# Patient Record
Sex: MALE | Race: WHITE | NOT HISPANIC OR LATINO | Employment: OTHER | URBAN - METROPOLITAN AREA
[De-identification: names, ages, dates, MRNs, and addresses within clinical notes are randomized per-mention and may not be internally consistent; named-entity substitution may affect disease eponyms.]

---

## 2023-03-27 PROBLEM — E66.9 CLASS 2 OBESITY WITH BODY MASS INDEX (BMI) OF 38.0 TO 38.9 IN ADULT: Status: ACTIVE | Noted: 2023-03-27

## 2023-03-27 PROBLEM — R29.898 UPPER EXTREMITY WEAKNESS: Status: ACTIVE | Noted: 2023-03-27

## 2023-03-27 PROBLEM — D50.9 IRON DEFICIENCY ANEMIA: Status: ACTIVE | Noted: 2023-03-27

## 2023-03-27 PROBLEM — K21.9 GASTROESOPHAGEAL REFLUX DISEASE: Status: ACTIVE | Noted: 2023-03-27

## 2023-03-27 PROBLEM — K29.70 GASTRITIS: Status: ACTIVE | Noted: 2023-03-27

## 2023-03-27 PROBLEM — E78.00 HYPERCHOLESTEROLEMIA: Status: ACTIVE | Noted: 2023-03-27

## 2023-03-27 PROBLEM — J10.1 INFLUENZA B: Status: ACTIVE | Noted: 2023-03-27

## 2023-03-27 PROBLEM — R29.3 POSTURE ABNORMALITY: Status: ACTIVE | Noted: 2023-03-27

## 2023-03-27 PROBLEM — G47.31 SLEEP APNEA, PRIMARY CENTRAL: Status: ACTIVE | Noted: 2023-03-27

## 2023-03-27 PROBLEM — I10 BENIGN ESSENTIAL HYPERTENSION: Status: ACTIVE | Noted: 2023-03-27

## 2023-03-27 PROBLEM — R79.0 ABNORMAL RESULT OF IRON PROFILE TESTING: Status: ACTIVE | Noted: 2023-03-27

## 2023-03-27 PROBLEM — M54.12 CERVICAL RADICULOPATHY: Status: ACTIVE | Noted: 2023-03-27

## 2023-03-27 PROBLEM — K91.2 MALNUTRITION FOLLOWING GASTROINTESTINAL SURGERY (HHS-HCC): Status: ACTIVE | Noted: 2023-03-27

## 2023-03-27 PROBLEM — E79.0 ELEVATED URIC ACID IN BLOOD: Status: ACTIVE | Noted: 2023-03-27

## 2023-03-27 PROBLEM — S16.1XXA NECK STRAIN: Status: ACTIVE | Noted: 2023-03-27

## 2023-03-27 PROBLEM — R50.9 FEVER IN ADULT: Status: ACTIVE | Noted: 2023-03-27

## 2023-03-27 PROBLEM — K91.2 POSTOPERATIVE MALABSORPTION (HHS-HCC): Status: ACTIVE | Noted: 2023-03-27

## 2023-03-27 PROBLEM — J31.0 RHINITIS: Status: ACTIVE | Noted: 2023-03-27

## 2023-03-27 PROBLEM — N52.9 ERECTILE DYSFUNCTION: Status: ACTIVE | Noted: 2023-03-27

## 2023-03-27 PROBLEM — G47.33 OBSTRUCTIVE SLEEP APNEA: Status: ACTIVE | Noted: 2023-03-27

## 2023-03-27 PROBLEM — R11.2 NAUSEA & VOMITING: Status: ACTIVE | Noted: 2023-03-27

## 2023-03-27 PROBLEM — L72.9 CYST OF SKIN: Status: ACTIVE | Noted: 2023-03-27

## 2023-03-27 PROBLEM — R73.09 ABNORMAL GLUCOSE: Status: ACTIVE | Noted: 2023-03-27

## 2023-03-27 PROBLEM — J45.909 ACTIVE ASTHMA (HHS-HCC): Status: ACTIVE | Noted: 2023-03-27
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
## 2023-03-27 PROBLEM — E11.9 CONTROLLED TYPE 2 DIABETES MELLITUS WITHOUT COMPLICATION, WITHOUT LONG-TERM CURRENT USE OF INSULIN (MULTI): Status: ACTIVE | Noted: 2023-03-27

## 2023-03-27 PROBLEM — E66.812 CLASS 2 OBESITY WITH BODY MASS INDEX (BMI) OF 38.0 TO 38.9 IN ADULT: Status: ACTIVE | Noted: 2023-03-27

## 2023-03-27 PROBLEM — I25.10 CORONARY ATHEROSCLEROSIS: Status: ACTIVE | Noted: 2023-03-27

## 2023-03-27 PROBLEM — F51.01 PRIMARY INSOMNIA: Status: ACTIVE | Noted: 2023-03-27

## 2023-03-27 PROBLEM — E78.1 HYPERTRIGLYCERIDEMIA: Status: ACTIVE | Noted: 2023-03-27

## 2023-03-27 PROBLEM — S86.112A STRAIN OF GASTROCNEMIUS MUSCLE OF LEFT LOWER EXTREMITY: Status: ACTIVE | Noted: 2023-03-27

## 2023-03-27 PROBLEM — K57.90 DIVERTICULOSIS: Status: ACTIVE | Noted: 2023-03-27

## 2023-03-27 PROBLEM — E55.9 VITAMIN D DEFICIENCY: Status: ACTIVE | Noted: 2023-03-27

## 2023-03-27 PROBLEM — Z98.84 BARIATRIC SURGERY STATUS: Status: ACTIVE | Noted: 2023-03-27

## 2023-03-27 PROBLEM — R73.9 HEMOGLOBIN A1C BETWEEN 7.0% AND 9.0%: Status: ACTIVE | Noted: 2023-03-27

## 2023-03-27 PROBLEM — K57.31 HEMORRHAGE OF LARGE INTESTINE DUE TO DIVERTICULAR DISEASE: Status: ACTIVE | Noted: 2023-03-27

## 2023-03-27 PROBLEM — G47.19 EXCESSIVE DAYTIME SLEEPINESS: Status: ACTIVE | Noted: 2023-03-27

## 2023-03-27 RX ORDER — HYDROCHLOROTHIAZIDE 25 MG/1
1 TABLET ORAL DAILY
COMMUNITY
Start: 2019-04-03 | End: 2023-10-31

## 2023-03-27 RX ORDER — ALBUTEROL SULFATE 90 UG/1
1-2 AEROSOL, METERED RESPIRATORY (INHALATION) AS NEEDED
COMMUNITY
Start: 2018-07-03

## 2023-03-27 RX ORDER — SILDENAFIL 50 MG/1
1-2 TABLET, FILM COATED ORAL DAILY PRN
COMMUNITY
Start: 2022-04-22 | End: 2023-10-27 | Stop reason: ALTCHOICE

## 2023-03-27 RX ORDER — FERROUS SULFATE 325(65) MG
1 TABLET ORAL DAILY
COMMUNITY
Start: 2021-07-29

## 2023-03-27 RX ORDER — PANTOPRAZOLE SODIUM 40 MG/1
1 TABLET, DELAYED RELEASE ORAL DAILY
COMMUNITY
Start: 2015-11-08 | End: 2023-07-26

## 2023-03-27 RX ORDER — METFORMIN HYDROCHLORIDE 500 MG/1
1 TABLET ORAL 2 TIMES DAILY
COMMUNITY
Start: 2021-07-29 | End: 2023-05-17

## 2023-03-27 RX ORDER — ATENOLOL 25 MG/1
25 TABLET ORAL 2 TIMES DAILY
COMMUNITY
Start: 2020-05-27 | End: 2023-10-27 | Stop reason: ALTCHOICE

## 2023-03-27 RX ORDER — AMLODIPINE BESYLATE 5 MG/1
1 TABLET ORAL 2 TIMES DAILY
COMMUNITY
Start: 2017-01-05 | End: 2023-10-31

## 2023-03-27 RX ORDER — MULTIVIT-MIN/IRON/FOLIC ACID/K 18-600-40
1 CAPSULE ORAL DAILY
COMMUNITY
Start: 2019-03-05

## 2023-03-27 RX ORDER — ASPIRIN 81 MG/1
TABLET ORAL
COMMUNITY

## 2023-03-27 RX ORDER — BENAZEPRIL HYDROCHLORIDE 20 MG/1
1 TABLET ORAL 2 TIMES DAILY
COMMUNITY
Start: 2019-08-26 | End: 2023-06-28

## 2023-03-27 RX ORDER — UBIDECARENONE 75 MG
CAPSULE ORAL
COMMUNITY
Start: 2020-11-25

## 2023-03-27 RX ORDER — FENOFIBRATE 145 MG/1
145 TABLET, FILM COATED ORAL DAILY
COMMUNITY
Start: 2022-04-22 | End: 2023-04-21

## 2023-03-27 RX ORDER — MULTIVITAMIN
1 TABLET ORAL 2 TIMES DAILY
COMMUNITY

## 2023-03-27 RX ORDER — LOVASTATIN 10 MG/1
1 TABLET ORAL NIGHTLY
COMMUNITY
Start: 2017-01-05 | End: 2023-06-28

## 2023-03-29 ENCOUNTER — OFFICE VISIT (OUTPATIENT)
Dept: PRIMARY CARE | Facility: CLINIC | Age: 58
End: 2023-03-29
Payer: COMMERCIAL

## 2023-03-29 VITALS
WEIGHT: 233 LBS | HEART RATE: 58 BPM | OXYGEN SATURATION: 98 % | DIASTOLIC BLOOD PRESSURE: 80 MMHG | SYSTOLIC BLOOD PRESSURE: 122 MMHG | BODY MASS INDEX: 35.43 KG/M2

## 2023-03-29 DIAGNOSIS — R30.0 DYSURIA: ICD-10-CM

## 2023-03-29 DIAGNOSIS — R21 RASH: Primary | ICD-10-CM

## 2023-03-29 LAB
POC APPEARANCE, URINE: CLEAR
POC BILIRUBIN, URINE: NEGATIVE
POC BLOOD, URINE: NEGATIVE
POC COLOR, URINE: YELLOW
POC GLUCOSE, URINE: NEGATIVE MG/DL
POC KETONES, URINE: NEGATIVE MG/DL
POC LEUKOCYTES, URINE: NEGATIVE
POC NITRITE,URINE: NEGATIVE
POC PH, URINE: 7 PH
POC PROTEIN, URINE: NEGATIVE MG/DL
POC SPECIFIC GRAVITY, URINE: 1.02
POC UROBILINOGEN, URINE: 0.2 EU/DL

## 2023-03-29 PROCEDURE — 1036F TOBACCO NON-USER: CPT | Performed by: INTERNAL MEDICINE

## 2023-03-29 PROCEDURE — 4010F ACE/ARB THERAPY RXD/TAKEN: CPT | Performed by: INTERNAL MEDICINE

## 2023-03-29 PROCEDURE — 99213 OFFICE O/P EST LOW 20 MIN: CPT | Performed by: INTERNAL MEDICINE

## 2023-03-29 PROCEDURE — 3074F SYST BP LT 130 MM HG: CPT | Performed by: INTERNAL MEDICINE

## 2023-03-29 PROCEDURE — 3079F DIAST BP 80-89 MM HG: CPT | Performed by: INTERNAL MEDICINE

## 2023-03-29 PROCEDURE — 81003 URINALYSIS AUTO W/O SCOPE: CPT | Performed by: INTERNAL MEDICINE

## 2023-03-29 PROCEDURE — 3008F BODY MASS INDEX DOCD: CPT | Performed by: INTERNAL MEDICINE

## 2023-03-29 RX ORDER — KETOCONAZOLE 20 MG/G
CREAM TOPICAL 2 TIMES DAILY
Qty: 30 G | Refills: 0 | Status: ON HOLD | OUTPATIENT
Start: 2023-03-29 | End: 2023-10-04 | Stop reason: ALTCHOICE

## 2023-03-29 ASSESSMENT — PATIENT HEALTH QUESTIONNAIRE - PHQ9
1. LITTLE INTEREST OR PLEASURE IN DOING THINGS: NOT AT ALL
SUM OF ALL RESPONSES TO PHQ9 QUESTIONS 1 AND 2: 0
2. FEELING DOWN, DEPRESSED OR HOPELESS: NOT AT ALL

## 2023-03-29 NOTE — PROGRESS NOTES
Subjective   Patient ID: Meir Richter is a 57 y.o. male who presents for Follow-up and UTI.    HPI   Reports dysuria x 3-4 days. Mild increase in frquency, no urgency. No hematuria or flank pain. Noticed some redness at the tip of his penis    Review of Systems   All other systems reviewed and are negative.      Objective   /80   Pulse 58   Wt 106 kg (233 lb)   SpO2 98%   BMI 35.43 kg/m²     Physical Exam  Constitutional:       Appearance: Normal appearance.   HENT:      Head: Normocephalic and atraumatic.      Mouth/Throat:      Pharynx: Posterior oropharyngeal erythema present.   Cardiovascular:      Rate and Rhythm: Normal rate and regular rhythm.      Heart sounds: Normal heart sounds. No murmur heard.     No gallop.   Pulmonary:      Effort: Pulmonary effort is normal. No respiratory distress.      Breath sounds: No wheezing or rales.   Skin:     General: Skin is warm and dry.      Findings: No rash.      Comments: ERYTHEMA NEAR URETHRAL MEATUS    Neurological:      Mental Status: He is alert and oriented to person, place, and time. Mental status is at baseline.   Psychiatric:         Mood and Affect: Mood normal.         Behavior: Behavior normal.         Assessment/Plan     #Dysuria  -UA wnl, has redness near urethral meatua. Will rx ketoconazole BID x 7-10 days. If dysuria not improving will have patient follow up with urology

## 2023-04-20 DIAGNOSIS — E78.00 HYPERCHOLESTEROLEMIA: Primary | ICD-10-CM

## 2023-04-21 RX ORDER — FENOFIBRATE 145 MG/1
TABLET, FILM COATED ORAL
Qty: 90 TABLET | Refills: 3 | Status: SHIPPED | OUTPATIENT
Start: 2023-04-21 | End: 2024-04-17

## 2023-04-26 ENCOUNTER — OFFICE VISIT (OUTPATIENT)
Dept: PRIMARY CARE | Facility: CLINIC | Age: 58
End: 2023-04-26
Payer: COMMERCIAL

## 2023-04-26 VITALS
BODY MASS INDEX: 36.49 KG/M2 | SYSTOLIC BLOOD PRESSURE: 135 MMHG | HEART RATE: 63 BPM | DIASTOLIC BLOOD PRESSURE: 80 MMHG | OXYGEN SATURATION: 97 % | WEIGHT: 240 LBS

## 2023-04-26 DIAGNOSIS — M79.641 PAIN OF RIGHT HAND: ICD-10-CM

## 2023-04-26 DIAGNOSIS — E11.9 CONTROLLED TYPE 2 DIABETES MELLITUS WITHOUT COMPLICATION, WITHOUT LONG-TERM CURRENT USE OF INSULIN (MULTI): ICD-10-CM

## 2023-04-26 DIAGNOSIS — Z12.5 SCREENING FOR PROSTATE CANCER: ICD-10-CM

## 2023-04-26 DIAGNOSIS — E78.00 HYPERCHOLESTEROLEMIA: ICD-10-CM

## 2023-04-26 DIAGNOSIS — I10 BENIGN ESSENTIAL HYPERTENSION: ICD-10-CM

## 2023-04-26 DIAGNOSIS — G47.33 OBSTRUCTIVE SLEEP APNEA: Primary | ICD-10-CM

## 2023-04-26 PROCEDURE — 3075F SYST BP GE 130 - 139MM HG: CPT | Performed by: INTERNAL MEDICINE

## 2023-04-26 PROCEDURE — 3079F DIAST BP 80-89 MM HG: CPT | Performed by: INTERNAL MEDICINE

## 2023-04-26 PROCEDURE — 4010F ACE/ARB THERAPY RXD/TAKEN: CPT | Performed by: INTERNAL MEDICINE

## 2023-04-26 PROCEDURE — 1036F TOBACCO NON-USER: CPT | Performed by: INTERNAL MEDICINE

## 2023-04-26 PROCEDURE — 99214 OFFICE O/P EST MOD 30 MIN: CPT | Performed by: INTERNAL MEDICINE

## 2023-04-26 RX ORDER — PREDNISONE 20 MG/1
20 TABLET ORAL 2 TIMES DAILY
Qty: 10 TABLET | Refills: 0 | Status: SHIPPED | OUTPATIENT
Start: 2023-04-26 | End: 2023-05-01

## 2023-04-26 NOTE — PROGRESS NOTES
Subjective   Patient ID: Meir Richter is a 58 y.o. male who presents for BP follow up   Concerns with hands and sleep     HPI   BP well controlled     Has right hand stiffness and pain in his right hand. Has known OA in his CMC and right middle finger. He follows with Dr. Sadler .     He does not wear his CPAP due uncomfortably, follows with Dr. Bellamy      Review of Systems   All other systems reviewed and are negative.      Objective   Wt 109 kg (240 lb)   BMI 36.49 kg/m²     Physical Exam  Constitutional:       General: He is not in acute distress.     Appearance: Normal appearance.   HENT:      Head: Normocephalic and atraumatic.      Right Ear: Tympanic membrane and ear canal normal.      Left Ear: Tympanic membrane and ear canal normal.      Nose: Nose normal.   Cardiovascular:      Rate and Rhythm: Normal rate and regular rhythm.      Heart sounds: Normal heart sounds. No murmur heard.     No gallop.   Pulmonary:      Effort: Pulmonary effort is normal. No respiratory distress.      Breath sounds: Normal breath sounds. No wheezing or rales.   Abdominal:      General: Abdomen is flat. There is no distension.      Palpations: Abdomen is soft.      Tenderness: There is no abdominal tenderness.      Hernia: No hernia is present.   Musculoskeletal:         General: Normal range of motion.      Right lower leg: No edema.      Left lower leg: No edema.   Skin:     General: Skin is warm and dry.      Findings: No rash.   Neurological:      General: No focal deficit present.      Mental Status: He is alert and oriented to person, place, and time. Mental status is at baseline.   Psychiatric:         Mood and Affect: Mood normal.         Behavior: Behavior normal.         Assessment/Plan       #CAD s/p PCI 2/2022  -Follows with Dr. Christopher, no current cardiac sx. Cont DAPT and lovastatin daily, fenofibrate. Check lipids today      #DM2  -Check A1c today, cont metformin. Eye exam UTD     #HTN  -Well controlled: Cont  amlodipine 5mg daily, atenolol 25mg daily , HCTZ 25mg daily and benazepril 20mg BID     #WM/CSA  -f/u with Dr. Bellamy      #ED   -Sildenafil prn     #Right hand OA  -Rx prednisone and he will follow up with Dr. Sadler           Influenza: 10/26/22  COVID: x3  Prevnar 13: UTD   Pneumovax 23: UTD   Shingrix: Never   Colorectal ca screening: 10/12/22 - 10 years   PSA: 4/21/22     RTC 6 mo

## 2023-05-16 DIAGNOSIS — E11.9 CONTROLLED TYPE 2 DIABETES MELLITUS WITHOUT COMPLICATION, WITHOUT LONG-TERM CURRENT USE OF INSULIN (MULTI): ICD-10-CM

## 2023-05-16 NOTE — TELEPHONE ENCOUNTER
Requested Prescriptions     Pending Prescriptions Disp Refills    metFORMIN (Glucophage) 500 mg tablet [Pharmacy Med Name: METFORMIN HCL TABS 500MG] 180 tablet 1     Sig: TAKE 1 TABLET TWICE A DAY

## 2023-05-17 RX ORDER — METFORMIN HYDROCHLORIDE 500 MG/1
TABLET ORAL
Qty: 180 TABLET | Refills: 1 | Status: SHIPPED | OUTPATIENT
Start: 2023-05-17 | End: 2023-11-15

## 2023-06-28 DIAGNOSIS — E78.00 HYPERCHOLESTEROLEMIA: ICD-10-CM

## 2023-06-28 DIAGNOSIS — I10 BENIGN ESSENTIAL HYPERTENSION: ICD-10-CM

## 2023-06-28 RX ORDER — BENAZEPRIL HYDROCHLORIDE 20 MG/1
TABLET ORAL
Qty: 180 TABLET | Refills: 3 | Status: SHIPPED | OUTPATIENT
Start: 2023-06-28

## 2023-06-28 RX ORDER — LOVASTATIN 10 MG/1
TABLET ORAL
Qty: 90 TABLET | Refills: 3 | Status: SHIPPED | OUTPATIENT
Start: 2023-06-28

## 2023-07-26 DIAGNOSIS — K21.9 GASTROESOPHAGEAL REFLUX DISEASE, UNSPECIFIED WHETHER ESOPHAGITIS PRESENT: ICD-10-CM

## 2023-07-26 RX ORDER — PANTOPRAZOLE SODIUM 40 MG/1
40 TABLET, DELAYED RELEASE ORAL DAILY
Qty: 90 TABLET | Refills: 3 | Status: SHIPPED | OUTPATIENT
Start: 2023-07-26

## 2023-09-19 RX ORDER — SODIUM CHLORIDE 9 MG/ML
100 INJECTION, SOLUTION INTRAVENOUS CONTINUOUS
Status: CANCELLED | OUTPATIENT
Start: 2023-09-19

## 2023-10-02 PROBLEM — I25.119: Status: ACTIVE | Noted: 2023-03-27

## 2023-10-02 NOTE — H&P
History Of Present Illness  Meir Richter is a 58 y.o. male presenting with  angina for coronary angiography.     Past Medical History  He has a past medical history of Coronary artery disease, Diabetes mellitus (CMS/[facility]), Hypertension, Morbid (severe) obesity due to excess calories (CMS/[facility]) (11/01/2016), Other allergy status, other than to drugs and biological substances, Other conditions influencing health status, Palpitations, Personal history of other diseases of the circulatory system (04/09/2021), Personal history of other diseases of the circulatory system, Personal history of other diseases of the circulatory system, Personal history of other diseases of the digestive system (04/07/2014), Personal history of other diseases of the digestive system (04/07/2014), Personal history of other diseases of the musculoskeletal system and connective tissue, Personal history of other diseases of the nervous system and sense organs (04/07/2014), Personal history of other diseases of the nervous system and sense organs, Personal history of other diseases of the respiratory system, Personal history of other endocrine, nutritional and metabolic disease, Personal history of other endocrine, nutritional and metabolic disease, Personal history of other medical treatment, Personal history of other specified conditions, and Personal history of other specified conditions.    Surgical History  He has a past surgical history that includes Colectomy partial / total (04/02/2014); Hand surgery (08/15/2016); Stomach surgery (02/09/2017); and Colonoscopy (02/09/2017).     Social History  He reports that he has never smoked. He has never used smokeless tobacco. No history on file for alcohol use and drug use.    Family History  Family History   Problem Relation Name Age of Onset    Leukemia Mother      Coronary artery disease Father      Other (Cardiac disorder) Father      Diabetes Other      Other (Cardiac disorder) Sibling       Hypertension Sibling          Allergies  Adhesive    Review of Systems   Comprehensive 10-point review of systems negative otherwise as noted above in HPI   Physical Exam   Constitutional: Well developed, awake/alert/oriented x3, no distress, alert and cooperative  Eyes: PERRL, EOMI, clear sclera  ENMT: mucous membranes moist, no apparent injury, no lesions seen  Head/Neck: Neck supple, no apparent injury, thyroid without mass or tenderness, No JVD, trachea midline, no bruits  Respiratory/Thorax: Patent airways, CTAB, normal breath sounds with good chest expansion, thorax symmetric  Cardiovascular: Regular, rate and rhythm, no murmurs, 2+ equal pulses of the extremities, normal S 1and S 2  Gastrointestinal: Nondistended, soft, non-tender, no rebound tenderness or guarding, no masses palpable, no organomegaly, +BS, no bruits  Musculoskeletal: ROM intact, no joint swelling, normal strength  Extremities: normal extremities, no cyanosis edema, contusions or wounds, no clubbing  Neurological: alert and oriented x3, intact senses, motor, response and reflexes, normal strength  Lymphatic: No significant lymphadenopathy  Psychological: Appropriate mood and behavior  Skin: Warm and dry, no lesions, no rashes   Last Recorded Vitals  There were no vitals taken for this visit.    Relevant Results        No results found for this or any previous visit (from the past 96 hour(s)).      Assessment/Plan   Active Problems:    Atherosclerotic heart disease native coronary artery w/angina pectoris (CMS/HCC)      Proceed with coronary angiography       I spent 10 minutes in the professional and overall care of this patient.      Jase Kumar MD

## 2023-10-04 ENCOUNTER — HOSPITAL ENCOUNTER (OUTPATIENT)
Facility: HOSPITAL | Age: 58
Setting detail: OUTPATIENT SURGERY
Discharge: HOME | End: 2023-10-04
Attending: INTERNAL MEDICINE | Admitting: INTERNAL MEDICINE
Payer: COMMERCIAL

## 2023-10-04 VITALS
HEIGHT: 68 IN | DIASTOLIC BLOOD PRESSURE: 79 MMHG | WEIGHT: 239 LBS | BODY MASS INDEX: 36.22 KG/M2 | OXYGEN SATURATION: 97 % | RESPIRATION RATE: 14 BRPM | HEART RATE: 50 BPM | SYSTOLIC BLOOD PRESSURE: 138 MMHG

## 2023-10-04 DIAGNOSIS — I25.119 ATHEROSCLEROTIC HEART DISEASE OF NATIVE CORONARY ARTERY WITH UNSPECIFIED ANGINA PECTORIS (CMS-HCC): ICD-10-CM

## 2023-10-04 DIAGNOSIS — I25.112 ATHEROSCLEROTIC HEART DISEASE OF NATIVE CORONARY ARTERY WITH REFRACTORY ANGINA PECTORIS (CMS-HCC): ICD-10-CM

## 2023-10-04 PROCEDURE — 99152 MOD SED SAME PHYS/QHP 5/>YRS: CPT | Performed by: INTERNAL MEDICINE

## 2023-10-04 PROCEDURE — 92978 ENDOLUMINL IVUS OCT C 1ST: CPT | Performed by: INTERNAL MEDICINE

## 2023-10-04 PROCEDURE — 85347 COAGULATION TIME ACTIVATED: CPT | Performed by: INTERNAL MEDICINE

## 2023-10-04 PROCEDURE — 2500000005 HC RX 250 GENERAL PHARMACY W/O HCPCS: Performed by: INTERNAL MEDICINE

## 2023-10-04 PROCEDURE — C1753 CATH, INTRAVAS ULTRASOUND: HCPCS | Performed by: INTERNAL MEDICINE

## 2023-10-04 PROCEDURE — 2780000003 HC OR 278 NO HCPCS: Performed by: INTERNAL MEDICINE

## 2023-10-04 PROCEDURE — 93454 CORONARY ARTERY ANGIO S&I: CPT | Mod: 59 | Performed by: INTERNAL MEDICINE

## 2023-10-04 PROCEDURE — 2550000001 HC RX 255 CONTRASTS: Performed by: INTERNAL MEDICINE

## 2023-10-04 PROCEDURE — C1887 CATHETER, GUIDING: HCPCS | Performed by: INTERNAL MEDICINE

## 2023-10-04 PROCEDURE — C1725 CATH, TRANSLUMIN NON-LASER: HCPCS | Performed by: INTERNAL MEDICINE

## 2023-10-04 PROCEDURE — C1894 INTRO/SHEATH, NON-LASER: HCPCS | Performed by: INTERNAL MEDICINE

## 2023-10-04 PROCEDURE — 92920 PRQ TRLUML C ANGIOP 1ART&/BR: CPT | Performed by: INTERNAL MEDICINE

## 2023-10-04 PROCEDURE — 99153 MOD SED SAME PHYS/QHP EA: CPT | Performed by: INTERNAL MEDICINE

## 2023-10-04 PROCEDURE — C1769 GUIDE WIRE: HCPCS | Performed by: INTERNAL MEDICINE

## 2023-10-04 PROCEDURE — 2720000007 HC OR 272 NO HCPCS: Performed by: INTERNAL MEDICINE

## 2023-10-04 PROCEDURE — 2500000004 HC RX 250 GENERAL PHARMACY W/ HCPCS (ALT 636 FOR OP/ED): Performed by: INTERNAL MEDICINE

## 2023-10-04 PROCEDURE — C1760 CLOSURE DEV, VASC: HCPCS | Performed by: INTERNAL MEDICINE

## 2023-10-04 RX ORDER — LIDOCAINE HYDROCHLORIDE 20 MG/ML
INJECTION, SOLUTION INFILTRATION; PERINEURAL AS NEEDED
Status: DISCONTINUED | OUTPATIENT
Start: 2023-10-04 | End: 2023-10-04 | Stop reason: HOSPADM

## 2023-10-04 RX ORDER — ISOSORBIDE MONONITRATE 30 MG/1
30 TABLET, EXTENDED RELEASE ORAL
COMMUNITY
Start: 2023-09-13

## 2023-10-04 RX ORDER — MIDAZOLAM HYDROCHLORIDE 1 MG/ML
INJECTION INTRAMUSCULAR; INTRAVENOUS AS NEEDED
Status: DISCONTINUED | OUTPATIENT
Start: 2023-10-04 | End: 2023-10-04 | Stop reason: HOSPADM

## 2023-10-04 RX ORDER — FENTANYL CITRATE 50 UG/ML
INJECTION, SOLUTION INTRAMUSCULAR; INTRAVENOUS AS NEEDED
Status: DISCONTINUED | OUTPATIENT
Start: 2023-10-04 | End: 2023-10-04 | Stop reason: HOSPADM

## 2023-10-04 RX ORDER — HEPARIN SODIUM 1000 [USP'U]/ML
INJECTION, SOLUTION INTRAVENOUS; SUBCUTANEOUS AS NEEDED
Status: DISCONTINUED | OUTPATIENT
Start: 2023-10-04 | End: 2023-10-04 | Stop reason: HOSPADM

## 2023-10-04 RX ORDER — SODIUM CHLORIDE 9 MG/ML
INJECTION, SOLUTION INTRAVENOUS CONTINUOUS PRN
Status: DISCONTINUED | OUTPATIENT
Start: 2023-10-04 | End: 2023-10-04 | Stop reason: HOSPADM

## 2023-10-04 RX ORDER — NITROGLYCERIN 0.4 MG/1
0.4 TABLET SUBLINGUAL EVERY 5 MIN PRN
COMMUNITY
Start: 2023-09-13

## 2023-10-04 ASSESSMENT — PAIN - FUNCTIONAL ASSESSMENT
PAIN_FUNCTIONAL_ASSESSMENT: 0-10

## 2023-10-04 ASSESSMENT — PAIN SCALES - GENERAL
PAINLEVEL_OUTOF10: 0 - NO PAIN

## 2023-10-04 NOTE — Clinical Note
Accessed site: right femoral artery. Problem: Malnutrition  Goal: Improved Nutritional Intake    Intervention: Promote and Optimize Oral Intake  Intervention: Held Nutrition education- protein/fluid needs, potassium, phosphorus, sodium, carbohydrate modified diet, nutrition supplement therapy-commercial beverage      Recommendation:  1) Continue DM 2000 kcal renal diet s/p AKA  2) Continue Boost glucose Control BID + Glenn BID   3) Weigh pt weekly   4) Nutrition education verbally given, renal diet review at f/u     Goals: 1) PO intakes > 50% of meals and supplements a f/u   Nutrition Goal Status: Not met  Communication of RD Recs: (POC, sticky note, secure chat MD )

## 2023-10-04 NOTE — Clinical Note
Vessel: circumflex (1st marginal). Guidewire removed. The guidewire was removed due to: No longer indicated.

## 2023-10-04 NOTE — DISCHARGE SUMMARY
Discharge Diagnosis  CAD    Issues Requiring Follow-Up  Symptoms and access site follow up    Test Results Pending At Discharge  Pending Labs       No current pending labs.            Hospital Course   Angiography showed severe ostial OM1 disease, successful PTCA done with good results, stent not deployed due to lack of good landing zone and good POBA result, medical therapy of ramus disease advised, LAD stent patent     Pertinent Physical Exam At Time of Discharge  Physical Exam  Constitutional: Well developed, awake/alert/oriented x3, no distress, alert and cooperative  Eyes: PERRL, EOMI, clear sclera  ENMT: mucous membranes moist, no apparent injury, no lesions seen  Head/Neck: Neck supple, no apparent injury, thyroid without mass or tenderness, No JVD, trachea midline, no bruits  Respiratory/Thorax: Patent airways, CTAB, normal breath sounds with good chest expansion, thorax symmetric  Cardiovascular: Regular, rate and rhythm, no murmurs, 2+ equal pulses of the extremities, normal S 1and S 2  Gastrointestinal: Nondistended, soft, non-tender, no rebound tenderness or guarding, no masses palpable, no organomegaly, +BS, no bruits  Musculoskeletal: ROM intact, no joint swelling, normal strength  Extremities: normal extremities, no cyanosis edema, contusions or wounds, no clubbing  Neurological: alert and oriented x3, intact senses, motor, response and reflexes, normal strength  Lymphatic: No significant lymphadenopathy  Psychological: Appropriate mood and behavior  Skin: Warm and dry, no lesions, no rashes     Home Medications     Medication List      ASK your doctor about these medications     albuterol 90 mcg/actuation inhaler   amLODIPine 5 mg tablet; Commonly known as: Norvasc   ascorbic acid (vitamin C) 500 mg capsule   aspirin 81 mg EC tablet   atenolol 25 mg tablet; Commonly known as: Tenormin   benazepril 20 mg tablet; Commonly known as: Lotensin; TAKE 1 TABLET   TWICE A DAY   CALCIUM CITRATE PLUS ORAL    cyanocobalamin 500 mcg tablet; Commonly known as: Vitamin B-12   fenofibrate 145 mg tablet; Commonly known as: Tricor; TAKE 1 TABLET   DAILY   ferrous sulfate 325 (65 Fe) MG tablet   GLUCOSAM ALEXANDER DIP-CHONDROIT-C-MN ORAL   hydroCHLOROthiazide 25 mg tablet; Commonly known as: HYDRODiuril   isosorbide mononitrate ER 30 mg 24 hr tablet; Commonly known as: Imdur   L. acidophilus/Bifid. animalis 32 billion cell capsule   lovastatin 10 mg tablet; Commonly known as: Mevacor; TAKE 1 TABLET AT   BEDTIME   metFORMIN 500 mg tablet; Commonly known as: Glucophage; TAKE 1 TABLET   TWICE A DAY   multivitamin tablet   nitroglycerin 0.4 mg SL tablet; Commonly known as: Nitrostat   pantoprazole 40 mg EC tablet; Commonly known as: ProtoNix; TAKE 1 TABLET   DAILY   sildenafil 50 mg tablet; Commonly known as: Viagra   ticagrelor 90 mg tablet; Commonly known as: Brilinta   VITAMIN B COMPLEX ORAL       Outpatient Follow-Up  Future Appointments   Date Time Provider Department Center   10/27/2023 11:00 AM DO Raul [id number] Donato Kumar MD

## 2023-10-04 NOTE — Clinical Note
Angioplasty of the ostium marginal circumflex lesion. Inflation 1: Pressure = 16 won; Duration = 30 sec. Inflation 2: Pressure = 12 won; Duration = 30 sec. Inflation 3: Pressure = 16 won; Duration = 45 sec.

## 2023-10-04 NOTE — Clinical Note
Angioplasty of the ostium marginal circumflex lesion. Inflation 1: Pressure = 12 won; Duration = 30 sec. Inflation 2: Pressure = 12 won; Duration = 30 sec.

## 2023-10-04 NOTE — Clinical Note
07/26/22 10:53 AM     See documentation in the [employer] SmartForm       Juan José Pulido MA Vessel: circumflex (distal). Guidewire removed. The guidewire was removed due to: No longer indicated.

## 2023-10-04 NOTE — Clinical Note
Sheath was exchanged in the right femoral artery with ACCESS SYSTEM, PINNACLE PRECISION, 5FR X 10CM, ECHOGENIC NEEDLE.

## 2023-10-04 NOTE — POST-PROCEDURE NOTE
Physician Transition of Care Summary  Invasive Cardiovascular Lab    Procedure Date: 10/4/2023  Attending: Panel 1:     * Jase Kumar - Primary  Panel 2:     * Jaswinder Botello - Primary  Resident/Fellow/Other Assistant: No surgical staff documented.    Pre Procedure Indications:   New onset angina for less than or equal to 2 months     Post Procedure Diagnosis:   Severe ostial OM and mid Ramus disease, widely patent stent LAD    Procedure(s):   Left Heart Catheterization    Procedure Findings:   Severe OM, Ramus disease    Description of the Procedure:   See OP note    Complications:   None    Stents/Implants:       Anticoagulation/Antiplatelet Plan:   ASA Brilinta Heparin    Estimated Blood Loss:   * No values recorded between 10/4/2023  8:43 AM and 10/4/2023  9:44 AM *    Anesthesia: Moderate Sedation Anesthesia Staff: No anesthesia staff entered.    Any Specimen(s) Removed:   No specimens collected during this procedure.    Disposition:   Extended Obs admit      Electronically signed by: Jase Kumar MD, 10/4/2023 9:44 AM

## 2023-10-05 ENCOUNTER — HOSPITAL ENCOUNTER (OUTPATIENT)
Dept: CARDIOLOGY | Facility: HOSPITAL | Age: 58
Discharge: HOME | End: 2023-10-05
Payer: COMMERCIAL

## 2023-10-05 LAB
ATRIAL RATE: 49 BPM
P AXIS: 62 DEGREES
P OFFSET: 197 MS
P ONSET: 138 MS
PR INTERVAL: 172 MS
Q ONSET: 224 MS
QRS COUNT: 8 BEATS
QRS DURATION: 88 MS
QT INTERVAL: 462 MS
QTC CALCULATION(BAZETT): 417 MS
QTC FREDERICIA: 431 MS
R AXIS: 18 DEGREES
T AXIS: 12 DEGREES
T OFFSET: 455 MS
VENTRICULAR RATE: 49 BPM

## 2023-10-05 PROCEDURE — 93005 ELECTROCARDIOGRAM TRACING: CPT

## 2023-10-09 ENCOUNTER — HOSPITAL ENCOUNTER (OUTPATIENT)
Facility: HOSPITAL | Age: 58
Setting detail: OUTPATIENT SURGERY
End: 2023-10-09
Attending: INTERNAL MEDICINE | Admitting: INTERNAL MEDICINE
Payer: COMMERCIAL

## 2023-10-09 PROBLEM — I25.119 ATHEROSCLEROTIC HEART DISEASE OF NATIVE CORONARY ARTERY WITH UNSPECIFIED ANGINA PECTORIS (CMS-HCC): Status: ACTIVE | Noted: 2023-09-16

## 2023-10-23 ENCOUNTER — LAB (OUTPATIENT)
Dept: LAB | Facility: LAB | Age: 58
End: 2023-10-23
Payer: COMMERCIAL

## 2023-10-23 DIAGNOSIS — E78.00 HYPERCHOLESTEROLEMIA: ICD-10-CM

## 2023-10-23 DIAGNOSIS — D64.9 ANEMIA, UNSPECIFIED TYPE: ICD-10-CM

## 2023-10-23 DIAGNOSIS — Z12.5 SCREENING FOR PROSTATE CANCER: ICD-10-CM

## 2023-10-23 DIAGNOSIS — E11.9 CONTROLLED TYPE 2 DIABETES MELLITUS WITHOUT COMPLICATION, WITHOUT LONG-TERM CURRENT USE OF INSULIN (MULTI): ICD-10-CM

## 2023-10-23 LAB
ALBUMIN SERPL BCP-MCNC: 4.6 G/DL (ref 3.4–5)
ALP SERPL-CCNC: 39 U/L (ref 33–120)
ALT SERPL W P-5'-P-CCNC: 7 U/L (ref 10–52)
ANION GAP SERPL CALC-SCNC: 14 MMOL/L (ref 10–20)
AST SERPL W P-5'-P-CCNC: 16 U/L (ref 9–39)
BILIRUB SERPL-MCNC: 0.5 MG/DL (ref 0–1.2)
BUN SERPL-MCNC: 17 MG/DL (ref 6–23)
CALCIUM SERPL-MCNC: 9.8 MG/DL (ref 8.6–10.3)
CHLORIDE SERPL-SCNC: 103 MMOL/L (ref 98–107)
CHOLEST SERPL-MCNC: 146 MG/DL (ref 0–199)
CHOLESTEROL/HDL RATIO: 2.9
CO2 SERPL-SCNC: 30 MMOL/L (ref 21–32)
CREAT SERPL-MCNC: 0.87 MG/DL (ref 0.5–1.3)
ERYTHROCYTE [DISTWIDTH] IN BLOOD BY AUTOMATED COUNT: 13.5 % (ref 11.5–14.5)
EST. AVERAGE GLUCOSE BLD GHB EST-MCNC: 151 MG/DL
GFR SERPL CREATININE-BSD FRML MDRD: >90 ML/MIN/1.73M*2
GLUCOSE SERPL-MCNC: 131 MG/DL (ref 74–99)
HBA1C MFR BLD: 6.9 %
HCT VFR BLD AUTO: 40.2 % (ref 41–52)
HDLC SERPL-MCNC: 51.1 MG/DL
HGB BLD-MCNC: 12.8 G/DL (ref 13.5–17.5)
LDLC SERPL CALC-MCNC: 72 MG/DL
MCH RBC QN AUTO: 29.4 PG (ref 26–34)
MCHC RBC AUTO-ENTMCNC: 31.8 G/DL (ref 32–36)
MCV RBC AUTO: 92 FL (ref 80–100)
NON HDL CHOLESTEROL: 95 MG/DL (ref 0–149)
NRBC BLD-RTO: 0 /100 WBCS (ref 0–0)
PLATELET # BLD AUTO: 264 X10*3/UL (ref 150–450)
PMV BLD AUTO: 10.8 FL (ref 7.5–11.5)
POTASSIUM SERPL-SCNC: 4.6 MMOL/L (ref 3.5–5.3)
PROT SERPL-MCNC: 6.9 G/DL (ref 6.4–8.2)
PSA SERPL-MCNC: 0.82 NG/ML
RBC # BLD AUTO: 4.35 X10*6/UL (ref 4.5–5.9)
SODIUM SERPL-SCNC: 142 MMOL/L (ref 136–145)
TRIGL SERPL-MCNC: 115 MG/DL (ref 0–149)
VLDL: 23 MG/DL (ref 0–40)
WBC # BLD AUTO: 7.5 X10*3/UL (ref 4.4–11.3)

## 2023-10-23 PROCEDURE — 83036 HEMOGLOBIN GLYCOSYLATED A1C: CPT

## 2023-10-23 PROCEDURE — 36415 COLL VENOUS BLD VENIPUNCTURE: CPT

## 2023-10-23 PROCEDURE — 84165 PROTEIN E-PHORESIS SERUM: CPT | Performed by: INTERNAL MEDICINE

## 2023-10-23 PROCEDURE — 84153 ASSAY OF PSA TOTAL: CPT

## 2023-10-23 PROCEDURE — 84165 PROTEIN E-PHORESIS SERUM: CPT

## 2023-10-23 PROCEDURE — 80061 LIPID PANEL: CPT

## 2023-10-23 PROCEDURE — 86320 SERUM IMMUNOELECTROPHORESIS: CPT | Performed by: INTERNAL MEDICINE

## 2023-10-23 PROCEDURE — 80053 COMPREHEN METABOLIC PANEL: CPT

## 2023-10-23 PROCEDURE — 86334 IMMUNOFIX E-PHORESIS SERUM: CPT

## 2023-10-23 PROCEDURE — 85027 COMPLETE CBC AUTOMATED: CPT

## 2023-10-25 DIAGNOSIS — D64.9 ANEMIA, UNSPECIFIED TYPE: Primary | ICD-10-CM

## 2023-10-26 LAB — PROT SERPL-MCNC: 7.4 G/DL (ref 6.4–8.2)

## 2023-10-27 ENCOUNTER — OFFICE VISIT (OUTPATIENT)
Dept: PRIMARY CARE | Facility: CLINIC | Age: 58
End: 2023-10-27
Payer: COMMERCIAL

## 2023-10-27 VITALS
SYSTOLIC BLOOD PRESSURE: 138 MMHG | BODY MASS INDEX: 37.56 KG/M2 | DIASTOLIC BLOOD PRESSURE: 77 MMHG | WEIGHT: 247 LBS | OXYGEN SATURATION: 95 % | HEART RATE: 62 BPM

## 2023-10-27 DIAGNOSIS — E78.00 HYPERCHOLESTEROLEMIA: ICD-10-CM

## 2023-10-27 DIAGNOSIS — E11.9 CONTROLLED TYPE 2 DIABETES MELLITUS WITHOUT COMPLICATION, WITHOUT LONG-TERM CURRENT USE OF INSULIN (MULTI): Primary | ICD-10-CM

## 2023-10-27 DIAGNOSIS — I10 BENIGN ESSENTIAL HYPERTENSION: ICD-10-CM

## 2023-10-27 DIAGNOSIS — R05.1 ACUTE COUGH: ICD-10-CM

## 2023-10-27 PROCEDURE — 4010F ACE/ARB THERAPY RXD/TAKEN: CPT | Performed by: INTERNAL MEDICINE

## 2023-10-27 PROCEDURE — 3078F DIAST BP <80 MM HG: CPT | Performed by: INTERNAL MEDICINE

## 2023-10-27 PROCEDURE — 3075F SYST BP GE 130 - 139MM HG: CPT | Performed by: INTERNAL MEDICINE

## 2023-10-27 PROCEDURE — 1036F TOBACCO NON-USER: CPT | Performed by: INTERNAL MEDICINE

## 2023-10-27 PROCEDURE — 3048F LDL-C <100 MG/DL: CPT | Performed by: INTERNAL MEDICINE

## 2023-10-27 PROCEDURE — 99214 OFFICE O/P EST MOD 30 MIN: CPT | Performed by: INTERNAL MEDICINE

## 2023-10-27 PROCEDURE — 3008F BODY MASS INDEX DOCD: CPT | Performed by: INTERNAL MEDICINE

## 2023-10-27 PROCEDURE — 3044F HG A1C LEVEL LT 7.0%: CPT | Performed by: INTERNAL MEDICINE

## 2023-10-27 RX ORDER — BENZONATATE 200 MG/1
200 CAPSULE ORAL 3 TIMES DAILY PRN
Qty: 30 CAPSULE | Refills: 0 | Status: SHIPPED | OUTPATIENT
Start: 2023-10-27 | End: 2023-11-26

## 2023-10-27 RX ORDER — NEBIVOLOL 10 MG/1
10 TABLET ORAL NIGHTLY
COMMUNITY
Start: 2023-10-17

## 2023-10-27 NOTE — PROGRESS NOTES
Subjective   Patient ID: Tc Richter is a 58 y.o. male who presents for Follow-up (6 month follow up ) and Cough.    HPI   Patient is doing well.  He does have an upper respiratory cold that started 5 days ago. Seems to be getting a little better, but is bothered by dry cough which wakes him up at night. Cough is productive in the morning with thick, yellow sputum. Denies fevers, chills, dysphagia. Did not take COVID test. Girlfriend was seen for similar sx about a week ago and is doing better.   Measures BP about once a week, usually ~135/80.  Right hand OA is still bothering him. Did not see Dr. Sadler because he does not want surgery.  Heart cath and balloon angioplasty ~3 weeks ago due to chest pain. Denies current CP, SOB, PASCUAL, or other cardiac sx.    Review of Systems   All other systems reviewed and are negative.      Objective   /77   Pulse 62   Wt 112 kg (247 lb)   SpO2 95%   BMI 37.56 kg/m²     Physical Exam  Constitutional:       General: He is not in acute distress.     Appearance: Normal appearance.   Cardiovascular:      Rate and Rhythm: Normal rate and regular rhythm.      Heart sounds: Normal heart sounds.   Pulmonary:      Effort: Pulmonary effort is normal. No respiratory distress.      Breath sounds: Normal breath sounds.   Skin:     General: Skin is warm and dry.   Neurological:      Mental Status: He is alert and oriented to person, place, and time.   Psychiatric:         Mood and Affect: Mood normal.         Behavior: Behavior normal.         Assessment/Plan             #Bronchitis        -Improving , defer abx for now. Rx Tessalon Perles prn     #CAD s/p PCI 2/2022 and PCTA 10/2023  -Follows with Dr. Christopher, no current cardiac sx. Cont DAPT and lovastatin 10mg daily, fenofibrate 145mg daily, Imdur 30mg daily      #DM2  -A1c 6.9 %  -Cont metformin 500mg BID. Eye exam UTD      #HTN  -Well controlled, goal <130/80. Encouraged to take daily BP at home.  -Cont amlodipine 5mg daily,  nebivolol 10mg daily, HCTZ 25mg daily, and benazepril 20mg BID     #WM/CSA  -f/u with Dr. Bellamy      #Right hand OA  -Encouraged to follow up with Dr. Sadler prn     Influenza: Will get  COVID: x3  Prevnar 13: UTD   Pneumovax 23: UTD   Shingrix: Never   Colorectal ca screening: 10/12/22 - 10 years   PSA: 10/23/23     Discussed lab work with patient. All questions answered.  RTC 6 mo     Medical student note. Physician co-sign required.

## 2023-10-30 DIAGNOSIS — I10 BENIGN ESSENTIAL HYPERTENSION: Primary | ICD-10-CM

## 2023-10-30 LAB
ALBUMIN: 4.5 G/DL (ref 3.4–5)
ALPHA 1 GLOBULIN: 0.3 G/DL (ref 0.2–0.6)
ALPHA 2 GLOBULIN: 0.8 G/DL (ref 0.4–1.1)
BETA GLOBULIN: 1 G/DL (ref 0.5–1.2)
GAMMA GLOBULIN: 0.8 G/DL (ref 0.5–1.4)
IMMUNOFIXATION COMMENT: NORMAL
PATH REVIEW - SERUM IMMUNOFIXATION: NORMAL
PATH REVIEW-SERUM PROTEIN ELECTROPHORESIS: NORMAL
PROTEIN ELECTROPHORESIS COMMENT: NORMAL

## 2023-10-31 DIAGNOSIS — I10 BENIGN ESSENTIAL HYPERTENSION: Primary | ICD-10-CM

## 2023-10-31 RX ORDER — AMLODIPINE BESYLATE 5 MG/1
5 TABLET ORAL 2 TIMES DAILY
Qty: 180 TABLET | Refills: 2 | Status: SHIPPED | OUTPATIENT
Start: 2023-10-31

## 2023-10-31 RX ORDER — HYDROCHLOROTHIAZIDE 25 MG/1
25 TABLET ORAL DAILY
Qty: 90 TABLET | Refills: 2 | Status: SHIPPED | OUTPATIENT
Start: 2023-10-31

## 2023-10-31 NOTE — TELEPHONE ENCOUNTER
Requested Prescriptions     Pending Prescriptions Disp Refills    hydroCHLOROthiazide (HYDRODiuril) 25 mg tablet [Pharmacy Med Name: HYDROCHLOROTHIAZIDE TABS 25MG] 90 tablet 2     Sig: TAKE 1 TABLET DAILY

## 2023-10-31 NOTE — TELEPHONE ENCOUNTER
Requested Prescriptions     Pending Prescriptions Disp Refills    amLODIPine (Norvasc) 5 mg tablet [Pharmacy Med Name: AMLODIPINE BESYLATE TABS 5MG] 180 tablet 2     Sig: TAKE 1 TABLET TWICE A DAY

## 2023-11-13 DIAGNOSIS — E11.9 CONTROLLED TYPE 2 DIABETES MELLITUS WITHOUT COMPLICATION, WITHOUT LONG-TERM CURRENT USE OF INSULIN (MULTI): ICD-10-CM

## 2023-11-15 RX ORDER — METFORMIN HYDROCHLORIDE 500 MG/1
TABLET ORAL
Qty: 180 TABLET | Refills: 2 | Status: SHIPPED | OUTPATIENT
Start: 2023-11-15

## 2023-11-15 NOTE — TELEPHONE ENCOUNTER
Requested Prescriptions     Pending Prescriptions Disp Refills    metFORMIN (Glucophage) 500 mg tablet [Pharmacy Med Name: METFORMIN HCL TABS 500MG] 180 tablet 2     Sig: TAKE 1 TABLET TWICE A DAY

## 2023-11-20 ENCOUNTER — DOCUMENTATION (OUTPATIENT)
Dept: CARDIOLOGY | Facility: HOSPITAL | Age: 58
End: 2023-11-20
Payer: COMMERCIAL

## 2023-12-04 ENCOUNTER — OFFICE VISIT (OUTPATIENT)
Dept: PRIMARY CARE | Facility: CLINIC | Age: 58
End: 2023-12-04
Payer: COMMERCIAL

## 2023-12-04 VITALS
BODY MASS INDEX: 37.44 KG/M2 | WEIGHT: 247 LBS | SYSTOLIC BLOOD PRESSURE: 146 MMHG | HEART RATE: 54 BPM | HEIGHT: 68 IN | DIASTOLIC BLOOD PRESSURE: 73 MMHG | OXYGEN SATURATION: 96 %

## 2023-12-04 DIAGNOSIS — I48.0 PAROXYSMAL ATRIAL FIBRILLATION (MULTI): ICD-10-CM

## 2023-12-04 DIAGNOSIS — J30.9 NASAL SINUS INFLAMMATION DUE TO ALLERGY: Primary | ICD-10-CM

## 2023-12-04 PROCEDURE — 3008F BODY MASS INDEX DOCD: CPT | Performed by: NURSE PRACTITIONER

## 2023-12-04 PROCEDURE — 3077F SYST BP >= 140 MM HG: CPT | Performed by: NURSE PRACTITIONER

## 2023-12-04 PROCEDURE — 1036F TOBACCO NON-USER: CPT | Performed by: NURSE PRACTITIONER

## 2023-12-04 PROCEDURE — 3044F HG A1C LEVEL LT 7.0%: CPT | Performed by: NURSE PRACTITIONER

## 2023-12-04 PROCEDURE — 4010F ACE/ARB THERAPY RXD/TAKEN: CPT | Performed by: NURSE PRACTITIONER

## 2023-12-04 PROCEDURE — 3048F LDL-C <100 MG/DL: CPT | Performed by: NURSE PRACTITIONER

## 2023-12-04 PROCEDURE — 99214 OFFICE O/P EST MOD 30 MIN: CPT | Performed by: NURSE PRACTITIONER

## 2023-12-04 PROCEDURE — 3078F DIAST BP <80 MM HG: CPT | Performed by: NURSE PRACTITIONER

## 2023-12-04 ASSESSMENT — ENCOUNTER SYMPTOMS
SHORTNESS OF BREATH: 0
CONSTIPATION: 0
SINUS PRESSURE: 1
NAUSEA: 0
PSYCHIATRIC NEGATIVE: 1
NUMBNESS: 0
WEAKNESS: 0
FATIGUE: 0
DIARRHEA: 0
SORE THROAT: 0
MUSCULOSKELETAL NEGATIVE: 1
DIZZINESS: 0
PALPITATIONS: 0
FEVER: 0
ABDOMINAL PAIN: 0
COUGH: 0
CHILLS: 0
VOMITING: 0
HEADACHES: 0

## 2023-12-04 ASSESSMENT — PATIENT HEALTH QUESTIONNAIRE - PHQ9
SUM OF ALL RESPONSES TO PHQ9 QUESTIONS 1 AND 2: 0
2. FEELING DOWN, DEPRESSED OR HOPELESS: NOT AT ALL
1. LITTLE INTEREST OR PLEASURE IN DOING THINGS: NOT AT ALL

## 2023-12-04 NOTE — ASSESSMENT & PLAN NOTE
No seizures activity since arrival to the ED, Alert and oriented x4   Wean off of Afrin as we discussed.  Continue to use Flonase daily.  Allergy medication over-the-counter Claritin, Zyrtec, or Allegra.  Use Vicks to try to clear nasal passages  Will consider CT of the sinus if no improvement in 4 to 6 weeks  With results of CT sinus consider ENT

## 2023-12-04 NOTE — PROGRESS NOTES
"Subjective   Patient ID: Tc Richter is a 58 y.o. male who presents for sinus issues.    HPI   Sinus issues for several years.  Was clearing up with afrin and Flonase.  Over the past year, has been more consistent and nasal sprays are not working.  Has tried allergy medication, Claritin in the past.  Can't breath through his nose.  Not so much congested but blocked.  Now becoming a constant issue without relief  Would like to avoid the ENT for now.      Review of Systems   Constitutional:  Negative for chills, fatigue and fever.   HENT:  Positive for congestion and sinus pressure. Negative for ear pain and sore throat.    Eyes:  Negative for visual disturbance.   Respiratory:  Negative for cough and shortness of breath.    Cardiovascular:  Negative for chest pain, palpitations and leg swelling.   Gastrointestinal:  Negative for abdominal pain, constipation, diarrhea, nausea and vomiting.   Genitourinary: Negative.    Musculoskeletal: Negative.    Skin:  Negative for rash.   Neurological:  Negative for dizziness, weakness, numbness and headaches.   Psychiatric/Behavioral: Negative.         Objective   /73   Pulse 54   Ht 1.727 m (5' 8\")   Wt 112 kg (247 lb)   SpO2 96%   BMI 37.56 kg/m²     Physical Exam  Constitutional:       General: He is not in acute distress.     Appearance: Normal appearance.   HENT:      Head: Normocephalic and atraumatic.      Right Ear: Tympanic membrane, ear canal and external ear normal.      Left Ear: Tympanic membrane, ear canal and external ear normal.      Nose: Nose normal.      Mouth/Throat:      Mouth: Mucous membranes are moist.      Pharynx: Oropharynx is clear.   Eyes:      Extraocular Movements: Extraocular movements intact.      Conjunctiva/sclera: Conjunctivae normal.      Pupils: Pupils are equal, round, and reactive to light.   Cardiovascular:      Rate and Rhythm: Normal rate and regular rhythm.      Pulses: Normal pulses.      Heart sounds: Normal heart " sounds. No murmur heard.  Pulmonary:      Effort: Pulmonary effort is normal.      Breath sounds: Normal breath sounds. No wheezing, rhonchi or rales.   Abdominal:      General: Bowel sounds are normal.      Palpations: Abdomen is soft.      Tenderness: There is no abdominal tenderness.   Musculoskeletal:         General: Normal range of motion.      Cervical back: Normal range of motion and neck supple.   Lymphadenopathy:      Comments: No lymphadenopathy noted   Skin:     General: Skin is warm and dry.      Findings: No rash.   Neurological:      General: No focal deficit present.      Mental Status: He is alert and oriented to person, place, and time.      Cranial Nerves: No cranial nerve deficit.      Coordination: Coordination normal.      Gait: Gait normal.   Psychiatric:         Mood and Affect: Mood normal.         Behavior: Behavior normal.       Assessment/Plan   Problem List Items Addressed This Visit             ICD-10-CM    Paroxysmal atrial fibrillation (CMS/HCC) I48.0     Stable           Nasal sinus inflammation due to allergy - Primary J30.9     Wean off of Afrin as we discussed.  Continue to use Flonase daily.  Allergy medication over-the-counter Claritin, Zyrtec, or Allegra.  Use Vicks to try to clear nasal passages  Will consider CT of the sinus if no improvement in 4 to 6 weeks  With results of CT sinus consider ENT          Follow-up in 4 to 6 weeks

## 2024-01-15 ENCOUNTER — APPOINTMENT (OUTPATIENT)
Dept: PRIMARY CARE | Facility: CLINIC | Age: 59
End: 2024-01-15
Payer: COMMERCIAL

## 2024-02-16 ENCOUNTER — OFFICE VISIT (OUTPATIENT)
Dept: PRIMARY CARE | Facility: CLINIC | Age: 59
End: 2024-02-16
Payer: COMMERCIAL

## 2024-02-16 VITALS
OXYGEN SATURATION: 93 % | SYSTOLIC BLOOD PRESSURE: 154 MMHG | HEART RATE: 67 BPM | WEIGHT: 247 LBS | DIASTOLIC BLOOD PRESSURE: 90 MMHG | BODY MASS INDEX: 37.56 KG/M2

## 2024-02-16 DIAGNOSIS — B96.89 ACUTE BACTERIAL SINUSITIS: ICD-10-CM

## 2024-02-16 DIAGNOSIS — J32.9 CHRONIC RHINOSINUSITIS: Primary | ICD-10-CM

## 2024-02-16 DIAGNOSIS — R05.1 ACUTE COUGH: ICD-10-CM

## 2024-02-16 DIAGNOSIS — J01.90 ACUTE BACTERIAL SINUSITIS: ICD-10-CM

## 2024-02-16 PROCEDURE — 99213 OFFICE O/P EST LOW 20 MIN: CPT | Performed by: INTERNAL MEDICINE

## 2024-02-16 PROCEDURE — 4010F ACE/ARB THERAPY RXD/TAKEN: CPT | Performed by: INTERNAL MEDICINE

## 2024-02-16 PROCEDURE — 1036F TOBACCO NON-USER: CPT | Performed by: INTERNAL MEDICINE

## 2024-02-16 PROCEDURE — 3080F DIAST BP >= 90 MM HG: CPT | Performed by: INTERNAL MEDICINE

## 2024-02-16 PROCEDURE — 3077F SYST BP >= 140 MM HG: CPT | Performed by: INTERNAL MEDICINE

## 2024-02-16 RX ORDER — AMOXICILLIN AND CLAVULANATE POTASSIUM 875; 125 MG/1; MG/1
875 TABLET, FILM COATED ORAL 2 TIMES DAILY
Qty: 20 TABLET | Refills: 0 | Status: SHIPPED | OUTPATIENT
Start: 2024-02-16 | End: 2024-02-26

## 2024-02-16 RX ORDER — BENZONATATE 200 MG/1
200 CAPSULE ORAL 3 TIMES DAILY PRN
Qty: 42 CAPSULE | Refills: 0 | Status: SHIPPED | OUTPATIENT
Start: 2024-02-16 | End: 2024-03-17

## 2024-02-16 NOTE — PATIENT INSTRUCTIONS
Please see ent number on form   CT sinus [phone]   Augmentin for sinus infection, Tessalon perles fo cough

## 2024-02-16 NOTE — PROGRESS NOTES
Subjective   Patient ID: Tc Richter is a 58 y.o. male who presents for Sinusitis ([id number]).    HPI   Patient states he has been having issues with his sinuses. This is a chronic issue on and off over the years. It has been particularly bad this year. He has been using Afrin pretty regularly over the past few months as his symptoms seem to have worsened. Right now he uses Afrin 3-4 times a day. Has been having difficulty trying to wean the Afrin as he is miserable and can't breathe through his nose. Becomes winded just breathing through his mouth. No symptoms as soon as his nasal passages open up.    Has a dry cough and blowing his nose has started producing yellow mucus for the last couple of weeks. Experiencing pressure in the back of his nose. No fevers, chills, or night sweats. No sinus pressure or pain.    Some sick contacts, but they have only had cold symptoms for a couple of weeks at a time.    A few weeks ago he had a few days of chest congestion but that has since cleared up. Some post nasal drip and nausea at that time, but none today.    Has never been to an allergist.    Review of Systems   All other systems reviewed and are negative.      Objective   /90   Pulse 67   Wt 112 kg (247 lb)   SpO2 93%   BMI 37.56 kg/m²     Physical Exam  Constitutional:       General: He is not in acute distress.     Appearance: Normal appearance.   HENT:      Nose: Mucosal edema, congestion and rhinorrhea present.      Right Turbinates: Swollen.      Left Turbinates: Swollen.   Cardiovascular:      Rate and Rhythm: Normal rate and regular rhythm.      Heart sounds: Normal heart sounds.   Pulmonary:      Effort: Pulmonary effort is normal. No respiratory distress.      Breath sounds: Normal breath sounds.   Skin:     General: Skin is warm and dry.   Neurological:      Mental Status: He is alert and oriented to person, place, and time.   Psychiatric:         Mood and Affect: Mood normal.         Behavior:  Behavior normal.         Assessment/Plan     #Chronic rhinosinusitis  #rhinitis medicamentosa  #c/f acute bacterial sinusitis  #acute cough  -possible anatomical issue predisposing for obstructive rhinitis  -Augmentin for 10 days  -tessalon pearls  -CT sinus  -referral to ENT  -stop Afrin use    Dm Tanner DO  Internal Medicine PGY1

## 2024-04-15 DIAGNOSIS — E78.00 HYPERCHOLESTEROLEMIA: ICD-10-CM

## 2024-04-15 NOTE — TELEPHONE ENCOUNTER
Requested Prescriptions     Pending Prescriptions Disp Refills    fenofibrate (Tricor) 145 mg tablet [Pharmacy Med Name: FENOFIBRATE TABS 145MG] 90 tablet 1     Sig: TAKE 1 TABLET DAILY

## 2024-04-17 RX ORDER — FENOFIBRATE 145 MG/1
TABLET, FILM COATED ORAL
Qty: 90 TABLET | Refills: 1 | Status: SHIPPED | OUTPATIENT
Start: 2024-04-17

## 2024-04-29 ENCOUNTER — APPOINTMENT (OUTPATIENT)
Dept: PRIMARY CARE | Facility: CLINIC | Age: 59
End: 2024-04-29
Payer: COMMERCIAL

## 2024-06-24 DIAGNOSIS — E78.00 HYPERCHOLESTEROLEMIA: ICD-10-CM

## 2024-06-24 DIAGNOSIS — I10 BENIGN ESSENTIAL HYPERTENSION: ICD-10-CM

## 2024-06-24 RX ORDER — LOVASTATIN 10 MG/1
TABLET ORAL
Qty: 90 TABLET | Refills: 3 | Status: SHIPPED | OUTPATIENT
Start: 2024-06-24

## 2024-06-24 RX ORDER — BENAZEPRIL HYDROCHLORIDE 20 MG/1
TABLET ORAL
Qty: 180 TABLET | Refills: 3 | Status: SHIPPED | OUTPATIENT
Start: 2024-06-24

## 2024-07-29 DIAGNOSIS — I10 BENIGN ESSENTIAL HYPERTENSION: ICD-10-CM

## 2024-07-29 RX ORDER — HYDROCHLOROTHIAZIDE 25 MG/1
25 TABLET ORAL DAILY
Qty: 90 TABLET | Refills: 3 | Status: SHIPPED | OUTPATIENT
Start: 2024-07-29

## 2024-07-29 RX ORDER — AMLODIPINE BESYLATE 5 MG/1
5 TABLET ORAL 2 TIMES DAILY
Qty: 180 TABLET | Refills: 3 | Status: SHIPPED | OUTPATIENT
Start: 2024-07-29

## 2024-08-08 DIAGNOSIS — E11.9 CONTROLLED TYPE 2 DIABETES MELLITUS WITHOUT COMPLICATION, WITHOUT LONG-TERM CURRENT USE OF INSULIN (MULTI): ICD-10-CM

## 2024-08-09 RX ORDER — METFORMIN HYDROCHLORIDE 500 MG/1
TABLET ORAL
Qty: 180 TABLET | Refills: 0 | Status: SHIPPED | OUTPATIENT
Start: 2024-08-09

## 2024-10-01 ENCOUNTER — APPOINTMENT (OUTPATIENT)
Dept: PRIMARY CARE | Facility: CLINIC | Age: 59
End: 2024-10-01
Payer: COMMERCIAL

## 2024-10-01 VITALS
OXYGEN SATURATION: 94 % | BODY MASS INDEX: 38.32 KG/M2 | DIASTOLIC BLOOD PRESSURE: 73 MMHG | HEART RATE: 59 BPM | SYSTOLIC BLOOD PRESSURE: 126 MMHG | WEIGHT: 252 LBS

## 2024-10-01 DIAGNOSIS — D50.9 IRON DEFICIENCY ANEMIA, UNSPECIFIED IRON DEFICIENCY ANEMIA TYPE: ICD-10-CM

## 2024-10-01 DIAGNOSIS — E11.9 CONTROLLED TYPE 2 DIABETES MELLITUS WITHOUT COMPLICATION, WITHOUT LONG-TERM CURRENT USE OF INSULIN (MULTI): ICD-10-CM

## 2024-10-01 DIAGNOSIS — Z23 NEED FOR INFLUENZA VACCINATION: ICD-10-CM

## 2024-10-01 DIAGNOSIS — E78.00 HYPERCHOLESTEROLEMIA: ICD-10-CM

## 2024-10-01 DIAGNOSIS — J45.909 ACTIVE ASTHMA (HHS-HCC): ICD-10-CM

## 2024-10-01 DIAGNOSIS — I10 BENIGN ESSENTIAL HYPERTENSION: Primary | ICD-10-CM

## 2024-10-01 DIAGNOSIS — Z12.5 SCREENING FOR PROSTATE CANCER: ICD-10-CM

## 2024-10-01 DIAGNOSIS — E53.8 B12 DEFICIENCY: ICD-10-CM

## 2024-10-01 DIAGNOSIS — G47.33 OBSTRUCTIVE SLEEP APNEA: ICD-10-CM

## 2024-10-01 PROCEDURE — 3061F NEG MICROALBUMINURIA REV: CPT | Performed by: INTERNAL MEDICINE

## 2024-10-01 PROCEDURE — 90471 IMMUNIZATION ADMIN: CPT | Performed by: INTERNAL MEDICINE

## 2024-10-01 PROCEDURE — 3074F SYST BP LT 130 MM HG: CPT | Performed by: INTERNAL MEDICINE

## 2024-10-01 PROCEDURE — 4010F ACE/ARB THERAPY RXD/TAKEN: CPT | Performed by: INTERNAL MEDICINE

## 2024-10-01 PROCEDURE — 1036F TOBACCO NON-USER: CPT | Performed by: INTERNAL MEDICINE

## 2024-10-01 PROCEDURE — 90656 IIV3 VACC NO PRSV 0.5 ML IM: CPT | Performed by: INTERNAL MEDICINE

## 2024-10-01 PROCEDURE — 3044F HG A1C LEVEL LT 7.0%: CPT | Performed by: INTERNAL MEDICINE

## 2024-10-01 PROCEDURE — 99214 OFFICE O/P EST MOD 30 MIN: CPT | Performed by: INTERNAL MEDICINE

## 2024-10-01 PROCEDURE — 3048F LDL-C <100 MG/DL: CPT | Performed by: INTERNAL MEDICINE

## 2024-10-01 PROCEDURE — 3078F DIAST BP <80 MM HG: CPT | Performed by: INTERNAL MEDICINE

## 2024-10-01 RX ORDER — ALBUTEROL SULFATE 90 UG/1
1-2 INHALANT RESPIRATORY (INHALATION) AS NEEDED
Qty: 54 G | Refills: 1 | Status: SHIPPED | OUTPATIENT
Start: 2024-10-01

## 2024-10-01 RX ORDER — NEBIVOLOL 5 MG/1
5 TABLET ORAL NIGHTLY
Qty: 90 TABLET | Refills: 1 | Status: SHIPPED | OUTPATIENT
Start: 2024-10-01

## 2024-10-01 RX ORDER — BENAZEPRIL HYDROCHLORIDE 20 MG/1
20 TABLET ORAL 2 TIMES DAILY
Qty: 180 TABLET | Refills: 3 | Status: SHIPPED | OUTPATIENT
Start: 2024-10-01

## 2024-10-01 NOTE — PROGRESS NOTES
Subjective   Patient ID: Tc Richter is a 59 y.o. male who presents for Follow-up and Med Management. Refills     HPI     Overall doing well  HR has been low to mid 50s at home. No Symptoms. Takes Bystolic  Weight is up 5 lbs over the past year      Review of Systems   All other systems reviewed and are negative.      Objective   There were no vitals taken for this visit.    Physical Exam  Constitutional:       Appearance: Normal appearance.   HENT:      Head: Normocephalic and atraumatic.   Cardiovascular:      Rate and Rhythm: Normal rate and regular rhythm.      Heart sounds: Normal heart sounds. No murmur heard.     No gallop.   Pulmonary:      Effort: Pulmonary effort is normal. No respiratory distress.      Breath sounds: No wheezing or rales.   Skin:     General: Skin is warm and dry.      Findings: No rash.   Neurological:      Mental Status: He is alert and oriented to person, place, and time. Mental status is at baseline.   Psychiatric:         Mood and Affect: Mood normal.         Behavior: Behavior normal.         Assessment/Plan       #CAD s/p PCI 2/2022 and PCTA 10/2023  -Follows with Dr. Christopher, no current cardiac sx. Cont DAPT and lovastatin 10mg daily, fenofibrate 145mg daily, Imdur 30mg daily      #DM2  -A1c 6.9 %  -Cont metformin 500mg BID. Eye exam UTD   -Start Ozempic, titrate to 0.5mg weekly      #HTN  -Well controlled, goal <130/80. Encouraged to take daily BP at home.  -Cont amlodipine 5mg BID,  HCTZ 25mg daily, and benazepril 20mg BID  -Decrease nebivolol to 5mg daily due to bradycardia      #WM/CSA  -f/u with Dr. Bellamy           Influenza: Will get  COVID: x3  Prevnar 13: UTD   Pneumovax 23: UTD   Shingrix: Never -recommended   Colorectal ca screening: 10/12/22 - 10 years   PSA: 10/23/23

## 2024-10-01 NOTE — PATIENT INSTRUCTIONS
Dr. Bellamy [phone]  Decrease nebivolol to 5mg daily, keep all other meds the same for now  Start Ozempic 0.25mg weekly for 4 weeks then 0.5mg weekly after that. First pen I gave you will last 6 weeks each pen after that will last you 4 weeks.   Please complete fasting blood work. Fast for 10 hours, black coffee and water the morning of labs are OK.   Shingles vaccine

## 2024-10-03 ENCOUNTER — LAB (OUTPATIENT)
Dept: LAB | Facility: LAB | Age: 59
End: 2024-10-03
Payer: COMMERCIAL

## 2024-10-03 DIAGNOSIS — Z12.5 SCREENING FOR PROSTATE CANCER: ICD-10-CM

## 2024-10-03 DIAGNOSIS — I10 BENIGN ESSENTIAL HYPERTENSION: ICD-10-CM

## 2024-10-03 DIAGNOSIS — E11.9 CONTROLLED TYPE 2 DIABETES MELLITUS WITHOUT COMPLICATION, WITHOUT LONG-TERM CURRENT USE OF INSULIN (MULTI): ICD-10-CM

## 2024-10-03 DIAGNOSIS — D50.9 IRON DEFICIENCY ANEMIA, UNSPECIFIED IRON DEFICIENCY ANEMIA TYPE: ICD-10-CM

## 2024-10-03 DIAGNOSIS — E78.00 HYPERCHOLESTEROLEMIA: ICD-10-CM

## 2024-10-03 DIAGNOSIS — E53.8 B12 DEFICIENCY: ICD-10-CM

## 2024-10-03 LAB
ALBUMIN SERPL BCP-MCNC: 4.6 G/DL (ref 3.4–5)
ALP SERPL-CCNC: 41 U/L (ref 33–120)
ALT SERPL W P-5'-P-CCNC: 7 U/L (ref 10–52)
ANION GAP SERPL CALC-SCNC: 15 MMOL/L (ref 10–20)
AST SERPL W P-5'-P-CCNC: 15 U/L (ref 9–39)
BILIRUB SERPL-MCNC: 0.5 MG/DL (ref 0–1.2)
BUN SERPL-MCNC: 16 MG/DL (ref 6–23)
CALCIUM SERPL-MCNC: 10.1 MG/DL (ref 8.6–10.3)
CHLORIDE SERPL-SCNC: 102 MMOL/L (ref 98–107)
CHOLEST SERPL-MCNC: 170 MG/DL (ref 0–199)
CHOLESTEROL/HDL RATIO: 3.3
CO2 SERPL-SCNC: 29 MMOL/L (ref 21–32)
CREAT SERPL-MCNC: 0.95 MG/DL (ref 0.5–1.3)
CREAT UR-MCNC: 123.9 MG/DL (ref 20–370)
EGFRCR SERPLBLD CKD-EPI 2021: >90 ML/MIN/1.73M*2
ERYTHROCYTE [DISTWIDTH] IN BLOOD BY AUTOMATED COUNT: 13.6 % (ref 11.5–14.5)
EST. AVERAGE GLUCOSE BLD GHB EST-MCNC: 146 MG/DL
FERRITIN SERPL-MCNC: 172 NG/ML (ref 20–300)
GLUCOSE SERPL-MCNC: 115 MG/DL (ref 74–99)
HBA1C MFR BLD: 6.7 %
HCT VFR BLD AUTO: 40.4 % (ref 41–52)
HDLC SERPL-MCNC: 51.9 MG/DL
HGB BLD-MCNC: 13.1 G/DL (ref 13.5–17.5)
IRON SATN MFR SERPL: 14 % (ref 25–45)
IRON SERPL-MCNC: 64 UG/DL (ref 35–150)
LDLC SERPL CALC-MCNC: 87 MG/DL
MCH RBC QN AUTO: 30.5 PG (ref 26–34)
MCHC RBC AUTO-ENTMCNC: 32.4 G/DL (ref 32–36)
MCV RBC AUTO: 94 FL (ref 80–100)
MICROALBUMIN UR-MCNC: 8 MG/L
MICROALBUMIN/CREAT UR: 6.5 UG/MG CREAT
NON HDL CHOLESTEROL: 118 MG/DL (ref 0–149)
NRBC BLD-RTO: 0 /100 WBCS (ref 0–0)
PLATELET # BLD AUTO: 234 X10*3/UL (ref 150–450)
POTASSIUM SERPL-SCNC: 4.6 MMOL/L (ref 3.5–5.3)
PROT SERPL-MCNC: 7.2 G/DL (ref 6.4–8.2)
PSA SERPL-MCNC: 0.7 NG/ML
RBC # BLD AUTO: 4.29 X10*6/UL (ref 4.5–5.9)
SODIUM SERPL-SCNC: 141 MMOL/L (ref 136–145)
TIBC SERPL-MCNC: 462 UG/DL (ref 240–445)
TRIGL SERPL-MCNC: 158 MG/DL (ref 0–149)
UIBC SERPL-MCNC: 398 UG/DL (ref 110–370)
VIT B12 SERPL-MCNC: 1343 PG/ML (ref 211–911)
VLDL: 32 MG/DL (ref 0–40)
WBC # BLD AUTO: 4.7 X10*3/UL (ref 4.4–11.3)

## 2024-10-03 PROCEDURE — 80061 LIPID PANEL: CPT

## 2024-10-03 PROCEDURE — 82728 ASSAY OF FERRITIN: CPT

## 2024-10-03 PROCEDURE — 85027 COMPLETE CBC AUTOMATED: CPT

## 2024-10-03 PROCEDURE — 82607 VITAMIN B-12: CPT

## 2024-10-03 PROCEDURE — 83540 ASSAY OF IRON: CPT

## 2024-10-03 PROCEDURE — G0103 PSA SCREENING: HCPCS

## 2024-10-03 PROCEDURE — 36415 COLL VENOUS BLD VENIPUNCTURE: CPT

## 2024-10-03 PROCEDURE — 82570 ASSAY OF URINE CREATININE: CPT

## 2024-10-03 PROCEDURE — 83550 IRON BINDING TEST: CPT

## 2024-10-03 PROCEDURE — 80053 COMPREHEN METABOLIC PANEL: CPT

## 2024-10-03 PROCEDURE — 83036 HEMOGLOBIN GLYCOSYLATED A1C: CPT

## 2024-10-03 PROCEDURE — 82043 UR ALBUMIN QUANTITATIVE: CPT

## 2024-10-09 DIAGNOSIS — D50.9 IRON DEFICIENCY ANEMIA, UNSPECIFIED IRON DEFICIENCY ANEMIA TYPE: Primary | ICD-10-CM

## 2024-10-14 DIAGNOSIS — E78.00 HYPERCHOLESTEROLEMIA: ICD-10-CM

## 2024-10-15 RX ORDER — FENOFIBRATE 145 MG/1
TABLET, FILM COATED ORAL
Qty: 90 TABLET | Refills: 3 | Status: SHIPPED | OUTPATIENT
Start: 2024-10-15

## 2024-11-06 DIAGNOSIS — E11.9 CONTROLLED TYPE 2 DIABETES MELLITUS WITHOUT COMPLICATION, WITHOUT LONG-TERM CURRENT USE OF INSULIN (MULTI): ICD-10-CM

## 2024-11-07 ENCOUNTER — HOSPITAL ENCOUNTER (EMERGENCY)
Facility: HOSPITAL | Age: 59
Discharge: HOME | End: 2024-11-07
Attending: STUDENT IN AN ORGANIZED HEALTH CARE EDUCATION/TRAINING PROGRAM
Payer: COMMERCIAL

## 2024-11-07 ENCOUNTER — APPOINTMENT (OUTPATIENT)
Dept: RADIOLOGY | Facility: HOSPITAL | Age: 59
End: 2024-11-07
Payer: COMMERCIAL

## 2024-11-07 ENCOUNTER — APPOINTMENT (OUTPATIENT)
Dept: CARDIOLOGY | Facility: HOSPITAL | Age: 59
End: 2024-11-07
Payer: COMMERCIAL

## 2024-11-07 VITALS
RESPIRATION RATE: 17 BRPM | HEIGHT: 67 IN | OXYGEN SATURATION: 100 % | WEIGHT: 245 LBS | DIASTOLIC BLOOD PRESSURE: 88 MMHG | HEART RATE: 69 BPM | SYSTOLIC BLOOD PRESSURE: 130 MMHG | BODY MASS INDEX: 38.45 KG/M2 | TEMPERATURE: 97.2 F

## 2024-11-07 DIAGNOSIS — E87.6 HYPOKALEMIA: ICD-10-CM

## 2024-11-07 DIAGNOSIS — F10.929 ALCOHOLIC INTOXICATION WITH COMPLICATION (CMS-HCC): Primary | ICD-10-CM

## 2024-11-07 DIAGNOSIS — R93.89 ABNORMAL CT SCAN: ICD-10-CM

## 2024-11-07 DIAGNOSIS — S09.90XA CLOSED HEAD INJURY, INITIAL ENCOUNTER: ICD-10-CM

## 2024-11-07 LAB
ANION GAP SERPL CALC-SCNC: 15 MMOL/L (ref 10–20)
BASOPHILS # BLD AUTO: 0.04 X10*3/UL (ref 0–0.1)
BASOPHILS NFR BLD AUTO: 0.4 %
BUN SERPL-MCNC: 14 MG/DL (ref 6–23)
CALCIUM SERPL-MCNC: 9.4 MG/DL (ref 8.6–10.3)
CARDIAC TROPONIN I PNL SERPL HS: 7 NG/L (ref 0–20)
CHLORIDE SERPL-SCNC: 97 MMOL/L (ref 98–107)
CO2 SERPL-SCNC: 24 MMOL/L (ref 21–32)
CREAT SERPL-MCNC: 0.94 MG/DL (ref 0.5–1.3)
EGFRCR SERPLBLD CKD-EPI 2021: >90 ML/MIN/1.73M*2
EOSINOPHIL # BLD AUTO: 0.15 X10*3/UL (ref 0–0.7)
EOSINOPHIL NFR BLD AUTO: 1.7 %
ERYTHROCYTE [DISTWIDTH] IN BLOOD BY AUTOMATED COUNT: 13.2 % (ref 11.5–14.5)
GLUCOSE BLD MANUAL STRIP-MCNC: 134 MG/DL (ref 74–99)
GLUCOSE SERPL-MCNC: 152 MG/DL (ref 74–99)
HCT VFR BLD AUTO: 42.1 % (ref 41–52)
HGB BLD-MCNC: 14.1 G/DL (ref 13.5–17.5)
IMM GRANULOCYTES # BLD AUTO: 0.04 X10*3/UL (ref 0–0.7)
IMM GRANULOCYTES NFR BLD AUTO: 0.4 % (ref 0–0.9)
LYMPHOCYTES # BLD AUTO: 3.23 X10*3/UL (ref 1.2–4.8)
LYMPHOCYTES NFR BLD AUTO: 36 %
MAGNESIUM SERPL-MCNC: 1.8 MG/DL (ref 1.6–2.4)
MCH RBC QN AUTO: 30.6 PG (ref 26–34)
MCHC RBC AUTO-ENTMCNC: 33.5 G/DL (ref 32–36)
MCV RBC AUTO: 91 FL (ref 80–100)
MONOCYTES # BLD AUTO: 1.03 X10*3/UL (ref 0.1–1)
MONOCYTES NFR BLD AUTO: 11.5 %
NEUTROPHILS # BLD AUTO: 4.49 X10*3/UL (ref 1.2–7.7)
NEUTROPHILS NFR BLD AUTO: 50 %
NRBC BLD-RTO: 0 /100 WBCS (ref 0–0)
PLATELET # BLD AUTO: 277 X10*3/UL (ref 150–450)
POTASSIUM SERPL-SCNC: 3.3 MMOL/L (ref 3.5–5.3)
RBC # BLD AUTO: 4.61 X10*6/UL (ref 4.5–5.9)
SODIUM SERPL-SCNC: 133 MMOL/L (ref 136–145)
WBC # BLD AUTO: 9 X10*3/UL (ref 4.4–11.3)

## 2024-11-07 PROCEDURE — 70450 CT HEAD/BRAIN W/O DYE: CPT | Performed by: STUDENT IN AN ORGANIZED HEALTH CARE EDUCATION/TRAINING PROGRAM

## 2024-11-07 PROCEDURE — 72125 CT NECK SPINE W/O DYE: CPT | Performed by: STUDENT IN AN ORGANIZED HEALTH CARE EDUCATION/TRAINING PROGRAM

## 2024-11-07 PROCEDURE — 85025 COMPLETE CBC W/AUTO DIFF WBC: CPT | Performed by: STUDENT IN AN ORGANIZED HEALTH CARE EDUCATION/TRAINING PROGRAM

## 2024-11-07 PROCEDURE — 83735 ASSAY OF MAGNESIUM: CPT | Performed by: STUDENT IN AN ORGANIZED HEALTH CARE EDUCATION/TRAINING PROGRAM

## 2024-11-07 PROCEDURE — 84484 ASSAY OF TROPONIN QUANT: CPT | Performed by: STUDENT IN AN ORGANIZED HEALTH CARE EDUCATION/TRAINING PROGRAM

## 2024-11-07 PROCEDURE — 99285 EMERGENCY DEPT VISIT HI MDM: CPT | Mod: 25

## 2024-11-07 PROCEDURE — G0390 TRAUMA RESPONS W/HOSP CRITI: HCPCS

## 2024-11-07 PROCEDURE — 93005 ELECTROCARDIOGRAM TRACING: CPT

## 2024-11-07 PROCEDURE — 2500000002 HC RX 250 W HCPCS SELF ADMINISTERED DRUGS (ALT 637 FOR MEDICARE OP, ALT 636 FOR OP/ED): Performed by: STUDENT IN AN ORGANIZED HEALTH CARE EDUCATION/TRAINING PROGRAM

## 2024-11-07 PROCEDURE — 70450 CT HEAD/BRAIN W/O DYE: CPT

## 2024-11-07 PROCEDURE — 80048 BASIC METABOLIC PNL TOTAL CA: CPT | Performed by: STUDENT IN AN ORGANIZED HEALTH CARE EDUCATION/TRAINING PROGRAM

## 2024-11-07 PROCEDURE — 72125 CT NECK SPINE W/O DYE: CPT

## 2024-11-07 PROCEDURE — 82947 ASSAY GLUCOSE BLOOD QUANT: CPT

## 2024-11-07 PROCEDURE — 36415 COLL VENOUS BLD VENIPUNCTURE: CPT | Performed by: STUDENT IN AN ORGANIZED HEALTH CARE EDUCATION/TRAINING PROGRAM

## 2024-11-07 RX ORDER — METFORMIN HYDROCHLORIDE 500 MG/1
TABLET ORAL
Qty: 180 TABLET | Refills: 1 | Status: SHIPPED | OUTPATIENT
Start: 2024-11-07

## 2024-11-07 RX ORDER — POTASSIUM CHLORIDE 20 MEQ/1
40 TABLET, EXTENDED RELEASE ORAL ONCE
Status: COMPLETED | OUTPATIENT
Start: 2024-11-07 | End: 2024-11-07

## 2024-11-07 ASSESSMENT — LIFESTYLE VARIABLES
TOTAL SCORE: 0
HAVE PEOPLE ANNOYED YOU BY CRITICIZING YOUR DRINKING: NO
EVER FELT BAD OR GUILTY ABOUT YOUR DRINKING: NO
EVER HAD A DRINK FIRST THING IN THE MORNING TO STEADY YOUR NERVES TO GET RID OF A HANGOVER: NO
HAVE YOU EVER FELT YOU SHOULD CUT DOWN ON YOUR DRINKING: NO

## 2024-11-08 LAB
ATRIAL RATE: 62 BPM
P AXIS: 65 DEGREES
P OFFSET: 175 MS
P ONSET: 124 MS
PR INTERVAL: 166 MS
Q ONSET: 207 MS
QRS COUNT: 10 BEATS
QRS DURATION: 100 MS
QT INTERVAL: 406 MS
QTC CALCULATION(BAZETT): 412 MS
QTC FREDERICIA: 410 MS
R AXIS: -19 DEGREES
T AXIS: 33 DEGREES
T OFFSET: 410 MS
VENTRICULAR RATE: 62 BPM

## 2024-11-08 NOTE — ED PROVIDER NOTES
History/Exam limitations: none  HPI was provided by patient, EMS, chart review       HPI:    Chief Complaint   Patient presents with    Head Injury        Meir Richter is a 59 y.o. male presents with chief complaint of fall.  Patient on Brilinta and had been at the bar drinking when he fell backwards on his stool and had short period of loss of consciousness possibly per bystander.  Admits to drinking multiple alcoholic beverages today.  Arrives here alert and oriented to person place but states there is no way he would know the time.  Denies any pain or injuries anywhere.  Denies any other illicit drug use.  This occurred just prior to arrival.         ROS: All other review of systems are negative except as noted above and HPI or ROS.   CONSTITUTIONAL:       fever, chills  EYES:      Blurry vision, change in vision  ENT:       sore throat, congestion, rhinorrhea  CARDIOVASCULAR:       chest pain, palpitations, swelling  RESPIRATORY:       cough, wheeze, shortness of breath  GI:       nausea, vomiting, diarrhea, abdominal pain  GENITOURINARY:       dysuria, hematuria, frequency  MUSCULOSKELETAL:       deformity, neck pain  SKIN:       rash, lesion  NEUROLOGIC:       headache, numbness, focal weakness  ENDOCRINE:      weight loss, fatigue  NOTES: All systems reviewed, negative except as described above       Physical Exam:  GENERAL: Alert,  cooperative,  in no acute distress.    HEAD: normocephalic, atraumatic    SKIN:  dry skin, no lesions.    EYES: PERRL, EOMs intact,  Conjunctiva pink with no erythema or exudates. No scleral icterus.     ENT: No external deformities. Nares patent, mucus membranes moist.  Pharynx clear, uvula midline.     NECK: Supple, without meningismus. Trachea at midline. No lymphadenopathy.    PULMONARY: Clear bilaterally. No crackles, rhonchi, wheezing.  No respiratory distress.  No work of breathing.    CARDIAC: Regular rate and regular rhythm.  Pulses 2+ in radials and dorsal pedal  pulses bilaterally.  No murmur, rub, gallop.  No edema.    ABDOMEN: Soft, nontender, active bowel sounds.  No palpable organomegaly.  No rebound or guarding.  No CVA tenderness.  No pulsatile masses.    : Exam deferred.    MUSCULOSKELETAL: Full range of motion throughout, no deformity.  No tender to palpation step-offs or deformities down CT or L-spine.  No tender palpation to bilateral lower extremities.    NEUROLOGICAL:  CN II through XII are grossly intact, no focal neuro deficits.  Strength 5 out of 5 throughout bilateral upper and lower extremities neurovascular intact in bilateral upper and lower extremities    PSYCHIATRIC: Appropriate mood and affect. Calm.       MDM/ED COURSE:    The patient presented for evaluation of a fall.  Differential but not limited to intracranial hemorrhage, arrhythmia, electrolyte abnormality, fracture,  Intoxication  Imaging studies if performed were independently reviewed and interpreted by myself and confirmed by radiologist.  Given the loss of consciousness and fall was activated as a limited trauma.      Patient feels safe for discharge home.  Patient nontoxic-appearing on reexamination.  Vital signs are stable.   The patient and caregiver are in agreement with the plan and given instructions to follow up with their PCP.  Has a sober ride here to take him home.  Advised him to drink less avoiding misstep in the future.    I discussed the differential, results and plan with the patient and/or family/friend/caregiver if present.       I emphasized the importance of follow-up with the physician I referred them to in the timeframe recommended.  I explained reasons for the patient to return to the Emergency Department. Additional verbal discharge instructions were also given and discussed with the patient to supplement those generated by the EMR. We also discussed medications that were prescribed (if any) including common side effects and interactions. The patient was advised to  abstain from driving, operating heavy machinery or making significant decisions while taking medications such as opiates and muscle relaxers that may impair this. All questions were addressed.  They understand return precautions and discharge instructions. The patient and/or family/friend/caregiver expressed understanding.     Note: This note was dictated by speech recognition. Minor errors in transcription may be present.    ED Course as of 11/07/24 2353   Thu Nov 07, 2024 2200 EKG interpreted by me shows normal sinus rhythm with sinus arrhythmia.  No STEMI.  Rate 62.  Compared to prior EKG similar findings present [WL]   2300 POTASSIUM(!): 3.3 [WL]   2300 Is clinically sober here.  On reevaluation alert and oriented x 3.  Potassium low this was given replacement.  Tolerating p.o. well here. [WL]   2301 Cts shows CT HEAD:  1. No evidence of hemorrhage, skull fracture, or other acute  intracranial trauma/abnormality.  2. Subtle attenuation changes are present in the periventricular and  subcortical white matter of bilateral cerebral hemispheres,  nonspecific findings favored to represent sequela of microvascular  disease.      CT C-SPINE:  1. Mild asymmetric widening of the left C4-C5 facet, favored to be  degenerative in etiology, although acute trauma is difficult to  entirely exclude without any prior images. Correlate with  pain/symptoms.  2. No fractures or traumatic spondylolisthesis is identified in the  cervical spine. No high-grade spinal canal stenosis.   [WL]   2301 Given finding on CT of mild asymmetric widening of C4-C5 facet patient has no tender palpation and full range of motion to this area likely degenerative less so traumatic. [WL]      ED Course User Index  [WL] Juan Jose Calzada DO         Diagnoses as of 11/07/24 2353   Alcoholic intoxication with complication (CMS-HCC)   Closed head injury, initial encounter   Abnormal CT scan   Hypokalemia         Past Medical History:   Diagnosis Date     Coronary artery disease     Diabetes mellitus (Multi)     Hypertension     Morbid (severe) obesity due to excess calories (Multi) 11/01/2016    Morbid obesity    Other allergy status, other than to drugs and biological substances     Environmental allergies    Other conditions influencing health status     Ulcer    Palpitations     Heart palpitations    Personal history of other diseases of the circulatory system 04/09/2021    History of malignant hypertension    Personal history of other diseases of the circulatory system     History of atrial fibrillation    Personal history of other diseases of the circulatory system     History of cardiac murmur    Personal history of other diseases of the digestive system 04/07/2014    History of gastroesophageal reflux (GERD)    Personal history of other diseases of the digestive system 04/07/2014    History of diverticulitis of colon    Personal history of other diseases of the musculoskeletal system and connective tissue     History of gout    Personal history of other diseases of the nervous system and sense organs 04/07/2014    History of sleep apnea    Personal history of other diseases of the nervous system and sense organs     History of glaucoma    Personal history of other diseases of the respiratory system     History of bronchitis    Personal history of other endocrine, nutritional and metabolic disease     History of diabetes mellitus    Personal history of other endocrine, nutritional and metabolic disease     History of obesity    Personal history of other medical treatment     History of stress test    Personal history of other specified conditions     History of headache    Personal history of other specified conditions     History of impaired glucose tolerance      Social History     Socioeconomic History    Marital status:    Tobacco Use    Smoking status: Never    Smokeless tobacco: Never   Vaping Use    Vaping status: Never Used   Substance and  Sexual Activity    Alcohol use: Yes     Alcohol/week: 8.0 - 12.0 standard drinks of alcohol     Types: 8 - 12 Cans of beer per week    Drug use: Never    Sexual activity: Yes     Current Outpatient Medications   Medication Instructions    albuterol 90 mcg/actuation inhaler 1-2 puffs, inhalation, As needed, Every 4 to 6 hours    amLODIPine (NORVASC) 5 mg, oral, 2 times daily    ascorbic acid, vitamin C, 500 mg capsule 1 capsule, oral, Daily    aspirin 81 mg EC tablet Aspirin 81 MG TABS   Refills: 0       Active    benazepril (LOTENSIN) 20 mg, oral, 2 times daily    calcium cit/mag/D3/Zn//jarod (CALCIUM CITRATE PLUS ORAL) 1 tablet, oral, 2 times daily    cyanocobalamin (Vitamin B-12) 500 mcg tablet Vitamin B 12 500 MCG Oral Tablet   Refills: 0        Start : 25-Nov-2020   Active    fenofibrate (Tricor) 145 mg tablet TAKE 1 TABLET DAILY    ferrous sulfate 325 (65 Fe) MG tablet 1 tablet, oral, Daily, With food    glucosamine/chondroitin/C/jarod (GLUCOSAM ALEXANDER DIP-CHONDROIT-C-MN ORAL) 1 capsule, oral, Daily, 1500 Complex Oral Capsule    hydroCHLOROthiazide (HYDRODIURIL) 25 mg, oral, Daily    isosorbide mononitrate ER (IMDUR) 30 mg, oral, Daily before breakfast    L. acidophilus/Bifid. animalis 32 billion cell capsule 1 capsule, oral, Daily    lovastatin (Mevacor) 10 mg tablet TAKE 1 TABLET AT BEDTIME    metFORMIN (Glucophage) 500 mg tablet TAKE 1 TABLET TWICE A DAY    multivitamin tablet 1 tablet, oral, 2 times daily    nebivolol (BYSTOLIC) 5 mg, oral, Nightly    nitroglycerin (NITROSTAT) 0.4 mg, sublingual, Every 5 min PRN    pantoprazole (PROTONIX) 40 mg, oral, Daily    semaglutide 0.5 mg, subcutaneous, Weekly    ticagrelor (Brilinta) 90 mg tablet 1 tablet, oral, 2 times daily    VITAMIN B COMPLEX ORAL 1 tablet, oral, Daily, Super B- Complex     No Known Allergies          ED Triage Vitals   Temp Pulse Resp BP   -- -- -- --      SpO2 Temp src Heart Rate Source Patient Position   -- -- -- --      BP Location FiO2 (%)      -- --               Labs and Imaging  CT cervical spine wo IV contrast   Final Result   CT HEAD:   1. No evidence of hemorrhage, skull fracture, or other acute   intracranial trauma/abnormality.   2. Subtle attenuation changes are present in the periventricular and   subcortical white matter of bilateral cerebral hemispheres,   nonspecific findings favored to represent sequela of microvascular   disease.        CT C-SPINE:   1. Mild asymmetric widening of the left C4-C5 facet, favored to be   degenerative in etiology, although acute trauma is difficult to   entirely exclude without any prior images. Correlate with   pain/symptoms.   2. No fractures or traumatic spondylolisthesis is identified in the   cervical spine. No high-grade spinal canal stenosis.        MACRO:   None        Signed by: César Guevara 11/7/2024 10:46 PM   Dictation workstation:   [id number]      CT head wo IV contrast   Final Result   CT HEAD:   1. No evidence of hemorrhage, skull fracture, or other acute   intracranial trauma/abnormality.   2. Subtle attenuation changes are present in the periventricular and   subcortical white matter of bilateral cerebral hemispheres,   nonspecific findings favored to represent sequela of microvascular   disease.        CT C-SPINE:   1. Mild asymmetric widening of the left C4-C5 facet, favored to be   degenerative in etiology, although acute trauma is difficult to   entirely exclude without any prior images. Correlate with   pain/symptoms.   2. No fractures or traumatic spondylolisthesis is identified in the   cervical spine. No high-grade spinal canal stenosis.        MACRO:   None        Signed by: César Guevara 11/7/2024 10:46 PM   Dictation workstation:   [id number]        Labs Reviewed   CBC WITH AUTO DIFFERENTIAL - Abnormal       Result Value    WBC 9.0      nRBC 0.0      RBC 4.61      Hemoglobin 14.1      Hematocrit 42.1      MCV 91      MCH 30.6      MCHC 33.5      RDW 13.2       Platelets 277      Neutrophils % 50.0      Immature Granulocytes %, Automated 0.4      Lymphocytes % 36.0      Monocytes % 11.5      Eosinophils % 1.7      Basophils % 0.4      Neutrophils Absolute 4.49      Immature Granulocytes Absolute, Automated 0.04      Lymphocytes Absolute 3.23      Monocytes Absolute 1.03 (*)     Eosinophils Absolute 0.15      Basophils Absolute 0.04     BASIC METABOLIC PANEL - Abnormal    Glucose 152 (*)     Sodium 133 (*)     Potassium 3.3 (*)     Chloride 97 (*)     Bicarbonate 24      Anion Gap 15      Urea Nitrogen 14      Creatinine 0.94      eGFR >90      Calcium 9.4     POCT GLUCOSE - Abnormal    POCT Glucose 134 (*)    MAGNESIUM - Normal    Magnesium 1.80     TROPONIN I, HIGH SENSITIVITY - Normal    Troponin I, High Sensitivity 7      Narrative:     Less than 99th percentile of normal range cutoff-  Female and children under 18 years old <14 ng/L; Male <21 ng/L: Negative  Repeat testing should be performed if clinically indicated.     Female and children under 18 years old 14-50 ng/L; Male 21-50 ng/L:  Consistent with possible cardiac damage and possible increased clinical   risk. Serial measurements may help to assess extent of myocardial damage.     >50 ng/L: Consistent with cardiac damage, increased clinical risk and  myocardial infarction. Serial measurements may help assess extent of   myocardial damage.      NOTE: Children less than 1 year old may have higher baseline troponin   levels and results should be interpreted in conjunction with the overall   clinical context.     NOTE: Troponin I testing is performed using a different   testing methodology at [facility] than at other   [facility]. Direct result comparisons should only   be made within the same method.   POCT FINGERSTICK GLUCOSE           Procedure  Procedures                  Juan Jose Calzada DO  11/07/24 6157

## 2024-11-27 ENCOUNTER — APPOINTMENT (OUTPATIENT)
Dept: PRIMARY CARE | Facility: CLINIC | Age: 59
End: 2024-11-27
Payer: COMMERCIAL

## 2024-11-27 VITALS
OXYGEN SATURATION: 94 % | WEIGHT: 249 LBS | SYSTOLIC BLOOD PRESSURE: 165 MMHG | BODY MASS INDEX: 39 KG/M2 | DIASTOLIC BLOOD PRESSURE: 84 MMHG | HEART RATE: 64 BPM

## 2024-11-27 DIAGNOSIS — R55 SYNCOPE, UNSPECIFIED SYNCOPE TYPE: Primary | ICD-10-CM

## 2024-11-27 DIAGNOSIS — J32.9 CHRONIC RHINOSINUSITIS: ICD-10-CM

## 2024-11-27 DIAGNOSIS — I10 BENIGN ESSENTIAL HYPERTENSION: ICD-10-CM

## 2024-11-27 PROCEDURE — 3048F LDL-C <100 MG/DL: CPT | Performed by: INTERNAL MEDICINE

## 2024-11-27 PROCEDURE — 3044F HG A1C LEVEL LT 7.0%: CPT | Performed by: INTERNAL MEDICINE

## 2024-11-27 PROCEDURE — 99214 OFFICE O/P EST MOD 30 MIN: CPT | Performed by: INTERNAL MEDICINE

## 2024-11-27 PROCEDURE — 3061F NEG MICROALBUMINURIA REV: CPT | Performed by: INTERNAL MEDICINE

## 2024-11-27 PROCEDURE — 4010F ACE/ARB THERAPY RXD/TAKEN: CPT | Performed by: INTERNAL MEDICINE

## 2024-11-27 PROCEDURE — 3079F DIAST BP 80-89 MM HG: CPT | Performed by: INTERNAL MEDICINE

## 2024-11-27 PROCEDURE — 1036F TOBACCO NON-USER: CPT | Performed by: INTERNAL MEDICINE

## 2024-11-27 PROCEDURE — 3077F SYST BP >= 140 MM HG: CPT | Performed by: INTERNAL MEDICINE

## 2024-11-27 NOTE — PATIENT INSTRUCTIONS
Please call Dr. Christopher (Cardiology) to follow up about your syncope (passing out). May need a Holter monitor to check for arrhythmias.    Call [phone] to schedule your CT sinus.    Your blood pressure was elevated today. Please check your blood pressure 1-2x daily and write it down. Send the results via [employer]. Goal BP is less than 130/80.  To check BP: rest for 5 mintues, have your feet flat/uncrossed on the floor and your arm at heart level.    Return to clinic at your next scheduled appointment.

## 2024-11-27 NOTE — PROGRESS NOTES
Subjective   Patient ID: Tc Richter is a 59 y.o. male who presents for Hospital Follow-up.    HPI   Patient fell while drinking alcohol, 2 beers, 2-3 shots over ~3 hours. Hadn't eaten. Syncopized while standing up, struck head on something on the wall. Uncertain how long he was unconscious. Denies any vision changes or dizziness. Does have headaches which he states predates the fall.    Before the fall also gets some neck pain that can sometimes radiate up to sides of face that causes some tingling of sides of face/cheeks. Improves with neck stretches. States that he was diagnosed with cervical arthritis.    Does not actually remember falling.  Does have a history of episode of A-fib w/ RVR 20 years ago. Occasionally feels short periods of heart racing. On bystolic, but was reduced ~2 months ago due to bradycardia. Not on OAC.    EtOH: Drinks maybe 12-14 beers per week. Does not drink in the morning. Does not feel that he needs to drink and can go days without any alcohol.    Does not check BP too often at home.  BP usually 140s/80s at home. Cutting back on salt intake.    Review of Systems   All other systems reviewed and are negative.    Objective   /84   Pulse 64   Wt 113 kg (249 lb)   SpO2 94%   BMI 39.00 kg/m²     Physical Exam  Vitals reviewed.   Constitutional:       General: He is not in acute distress.  HENT:      Mouth/Throat:      Mouth: Mucous membranes are moist.      Pharynx: Oropharynx is clear.   Eyes:      Extraocular Movements: Extraocular movements intact.      Conjunctiva/sclera: Conjunctivae normal.      Pupils: Pupils are equal, round, and reactive to light.   Cardiovascular:      Rate and Rhythm: Normal rate and regular rhythm.      Pulses: Normal pulses.      Heart sounds: Normal heart sounds.   Pulmonary:      Effort: Pulmonary effort is normal.      Breath sounds: Normal breath sounds.   Abdominal:      Palpations: Abdomen is soft.      Tenderness: There is no abdominal  tenderness.   Skin:     General: Skin is warm and dry.   Neurological:      Mental Status: He is alert.      Motor: No weakness.   Psychiatric:         Mood and Affect: Mood normal.         Behavior: Behavior normal.           Assessment/Plan   #syncope  -non-positional  -past hx of A-fib  -recommend patient follows up with Dr. Christopher for Holter monitor    #HTN  - goal <130/80  - Cont amlodipine 5mg BID,  HCTZ 25mg daily, benazepril 20mg BID, and nebivolol 5 mg daily  - cont checking BP at home, if still elevated then pt instructed to call office, will consider referral to nephro for resistant HTN    #WM  - has not tolerated CPAP in the past  - f/u w/ Dr. Bellamy    #chronic rhinosinusitis  - pt to schedule CT sinus  - referral to ENT ordered.       Dm Tanner DO  Internal Medicine PGY-2    I saw and evaluated the patient. I personally obtained the key and critical portions of the history and physical exam or was physically present for key and critical portions performed by the resident/fellow. I reviewed the resident/fellow's documentation and discussed the patient with the resident/fellow. I agree with the resident/fellow's medical decision making as documented in the note.    Ghulam Rabago, DO

## 2024-12-13 ENCOUNTER — APPOINTMENT (OUTPATIENT)
Dept: RADIOLOGY | Facility: HOSPITAL | Age: 59
End: 2024-12-13
Payer: COMMERCIAL

## 2024-12-19 ENCOUNTER — HOSPITAL ENCOUNTER (OUTPATIENT)
Dept: RADIOLOGY | Facility: HOSPITAL | Age: 59
Discharge: HOME | End: 2024-12-19
Payer: COMMERCIAL

## 2024-12-19 DIAGNOSIS — J32.9 CHRONIC RHINOSINUSITIS: ICD-10-CM

## 2024-12-19 PROCEDURE — 70486 CT MAXILLOFACIAL W/O DYE: CPT | Performed by: RADIOLOGY

## 2024-12-19 PROCEDURE — 70486 CT MAXILLOFACIAL W/O DYE: CPT

## 2025-01-08 ENCOUNTER — APPOINTMENT (OUTPATIENT)
Dept: PRIMARY CARE | Facility: CLINIC | Age: 60
End: 2025-01-08
Payer: COMMERCIAL

## 2025-01-08 VITALS
HEART RATE: 66 BPM | WEIGHT: 245 LBS | BODY MASS INDEX: 38.37 KG/M2 | OXYGEN SATURATION: 93 % | SYSTOLIC BLOOD PRESSURE: 170 MMHG | DIASTOLIC BLOOD PRESSURE: 95 MMHG

## 2025-01-08 DIAGNOSIS — E11.9 CONTROLLED TYPE 2 DIABETES MELLITUS WITHOUT COMPLICATION, WITHOUT LONG-TERM CURRENT USE OF INSULIN (MULTI): ICD-10-CM

## 2025-01-08 DIAGNOSIS — J32.9 CHRONIC RHINOSINUSITIS: ICD-10-CM

## 2025-01-08 DIAGNOSIS — I1A.0 RESISTANT HYPERTENSION: Primary | ICD-10-CM

## 2025-01-08 PROCEDURE — 1036F TOBACCO NON-USER: CPT | Performed by: INTERNAL MEDICINE

## 2025-01-08 PROCEDURE — 4010F ACE/ARB THERAPY RXD/TAKEN: CPT | Performed by: INTERNAL MEDICINE

## 2025-01-08 PROCEDURE — 3080F DIAST BP >= 90 MM HG: CPT | Performed by: INTERNAL MEDICINE

## 2025-01-08 PROCEDURE — 99214 OFFICE O/P EST MOD 30 MIN: CPT | Performed by: INTERNAL MEDICINE

## 2025-01-08 PROCEDURE — 3077F SYST BP >= 140 MM HG: CPT | Performed by: INTERNAL MEDICINE

## 2025-01-08 ASSESSMENT — COLUMBIA-SUICIDE SEVERITY RATING SCALE - C-SSRS
2. HAVE YOU ACTUALLY HAD ANY THOUGHTS OF KILLING YOURSELF?: NO
1. IN THE PAST MONTH, HAVE YOU WISHED YOU WERE DEAD OR WISHED YOU COULD GO TO SLEEP AND NOT WAKE UP?: NO
6. HAVE YOU EVER DONE ANYTHING, STARTED TO DO ANYTHING, OR PREPARED TO DO ANYTHING TO END YOUR LIFE?: NO

## 2025-01-08 ASSESSMENT — PATIENT HEALTH QUESTIONNAIRE - PHQ9
SUM OF ALL RESPONSES TO PHQ9 QUESTIONS 1 AND 2: 0
1. LITTLE INTEREST OR PLEASURE IN DOING THINGS: NOT AT ALL
2. FEELING DOWN, DEPRESSED OR HOPELESS: NOT AT ALL

## 2025-01-08 ASSESSMENT — PAIN SCALES - GENERAL: PAINLEVEL_OUTOF10: 0-NO PAIN

## 2025-01-08 NOTE — PROGRESS NOTES
Subjective   Patient ID: Tc Richter is a 59 y.o. male who presents for Follow-up.    HPI     Overall patient feels tired. Can't sleep at night due to chronic sinus issues. Patient stated he has next Wednesday appointment with ENT.   Patient stated having some bloating on ozempic, some occasional headache. Admitted to nausea, no vomiting, no diarrhea, has constipation and taking metamucil and it is helping. No energy, because of no sleep. Patient denied appetite change. Lost only couple pounds since the start of ozempic.  BP normally at home 140s/85. Taking all his medications on time.     BP today 170/95.       Review of Systems   All other systems reviewed and are negative.      Objective   BP (!) 170/95   Pulse 66   Wt 111 kg (245 lb)   SpO2 93%   BMI 38.37 kg/m²     Physical Exam  Constitutional:       Appearance: Normal appearance.   HENT:      Head: Normocephalic and atraumatic.   Cardiovascular:      Rate and Rhythm: Normal rate and regular rhythm.      Heart sounds: Normal heart sounds. No murmur heard.     No gallop.   Pulmonary:      Effort: Pulmonary effort is normal. No respiratory distress.      Breath sounds: No wheezing or rales.   Skin:     General: Skin is warm and dry.      Findings: No rash.   Neurological:      Mental Status: He is alert and oriented to person, place, and time. Mental status is at baseline.   Psychiatric:         Mood and Affect: Mood normal.         Behavior: Behavior normal.         Assessment/Plan         #chronic rhinosinusitis      - pt following with  ENT    #CAD s/p PCI 2/2022 and PCTA 10/2023  -Follows with Dr. Christopher, no current cardiac sx. Cont DAPT and lovastatin 10mg daily, fenofibrate 145mg daily, Imdur 30mg daily      #DM2  -A1c 6.7 %  -Cont metformin 500mg BID and Ozempic 0.5mg weekly  -Patient inquire if his insurance company will cover Mounjaro        #HTN  -Uncontrolled, goal <130/80. Encouraged to take daily BP at home.  -Cont amlodipine 5mg BID,  HCTZ  25mg daily, and benazepril 20mg BID and nebivolol 5mg daily   -referral dr Naik for refractory hypertension     #WM/CSA  -f/u with Dr. Bellamy         Influenza: Will get  COVID: x3  Prevnar 13: UTD   Pneumovax 23: UTD   Shingrix: Never -recommended   Colorectal ca screening: 10/12/22 - 10 years   PSA: 10/3/24    RTC 3-4 mo     Maritza Natarajan M.D.   Internal Medicine, PGY 1     I saw and evaluated the patient. I personally obtained the key and critical portions of the history and physical exam or was physically present for key and critical portions performed by the resident/fellow. I reviewed the resident/fellow's documentation and discussed the patient with the resident/fellow. I agree with the resident/fellow's medical decision making as documented in the note.    Ghulam Rabago, DO

## 2025-01-08 NOTE — PATIENT INSTRUCTIONS
Thank you for your visit:    -Your blood pressure was elevated today. Please check your blood pressure 1-2x daily and write it down. Send the results via [employer]. Goal BP is less than 130/80.  To check BP: rest for 5 mintues, have your feet flat/uncrossed on the floor and your arm at heart level.   - I referred you to Dr Naik, Nephrologist for refractory hypertension.  - Stop iron today and get the labs next week.  - will see you in 4 months to discuss any medication changes.

## 2025-01-13 NOTE — PROGRESS NOTES
Referring Provider: Ghulam Rabago DO    History of Present Illness:    Meir Richter is a 59 y.o. male, presenting for evaluation of chronic nasal congestion.  He states that he has been dealing with sinus issues for at least a decade, but most of his life. He has been using nasal saline for his nose and then eventually used afrin, which he states has become an addiction that he has weaned. He is still using afrin daily. He has tried flonase in the past with some relief, but is not currently using. He is also bothered by his sleep issues, which he said the nasal obstruction makes it very challenging to sleep.     ?  Review of Systems:    Review of symptoms was negative except for those stated including Cardiopulmonary, Genitourinary, Gastrointestinal, Psychological, Sleep pattern, Endocrine, Eyes, Neurologic, Musculoskeletal, Skin, Hematologic/Lymphatic and Allergic/Immunologic.     Medical History:    I have reviewed the patient's updated past medical history, surgical history, family history, social history, as well as current medications and allergies as of 1/15/2025. Changes to these items have been updated and marked as reviewed in the electronic medical record.    Physical Exam:    Vitals:  vitals were not taken for this visit.   General: Patient doing well overall and is in no apparent distress.  Psych: Pleasant affect, and answers questions appropriately.  Head & Face: Symmetric facial movements  Eyes: Pupils equal, round, reactive.  Extraocular movements intact without gaze restrictions or nystagmus. No epiphora.  Ears:  External auditory canals are normal.  Tympanic membranes are clear.  No middle ear effusion is seen.  All middle ear landmarks are normal.  Nose: Anterior rhinoscopy revealed normal sinonasal mucosa. More posterior areas of the nasal cavity could not be completely examined. ITH, b/l; septal deflection to left  Oral Cavity/Oropharynx:  Without lesions or masses to visual exam.  Neck:  Supple without lymphadenopathy.  Lungs: Non-labored, and without evidence of stridor.  Cardiac: Pulses are strong, well-perfused.  Extremities: Without gross evidence of clubbing, cyanosis, or edema.  Neuro: Cranial nerves II-XII grossly intact; Intact facial movements.    Results Reviewed:   CT Sinus 12/24 - Pansinusitis with underdeveloped frontal sinuses; mild septal deflection    Procedure:  Rigid nasal endoscopy (53575)  Pre-procedure diagnosis/Indication for procedure:  To evaluate areas not visualized on anterior rhinoscopy   Anesthesia:  None  Description:  A 0-degree 3-mm rigid nasal endoscope was used to examine the left and right nasal cavities.  The nasal valve areas were examined for abnormalities or collapse.  The inferior and middle turbinates were evaluated.  The middle and superior meatuses, and the sphenoethmoid recesses were examined and inspected for mucopurulence and polyps. Once the endoscope was withdrawn, the patient was noted to have tolerated the procedure well without complications and was returned to ambulatory status.    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 Scope exam toady showed boggy turbinates and polypoid appearing middle turbinates, although OMCs limited by narrowed cavities.     Plan:      - Fluticasone twice daily for 6 weeks  - Continue afrin as needed, but when ready, will begin to dilute afrin with saline until off  - Follow up in 2 months for a repeat examination to discuss the role for surgery  - Will likely need to repeat CT scan after afrin wean if surgery is desired/indicated  - Patient following with outside ENT for sleep management and possible INSPIRE placement in future      Ok Galvez MD, M.Eng.   of Otolaryngology - Head & Neck Surgery  Division of Rhinology and Endoscopic Skull Base  Surgery  [facility]

## 2025-01-15 ENCOUNTER — APPOINTMENT (OUTPATIENT)
Dept: OTOLARYNGOLOGY | Facility: CLINIC | Age: 60
End: 2025-01-15
Payer: COMMERCIAL

## 2025-01-15 VITALS — WEIGHT: 240 LBS | BODY MASS INDEX: 36.37 KG/M2 | HEIGHT: 68 IN

## 2025-01-15 DIAGNOSIS — J32.9 CHRONIC RHINOSINUSITIS: Primary | ICD-10-CM

## 2025-01-15 DIAGNOSIS — J31.0 RHINITIS MEDICAMENTOSA: ICD-10-CM

## 2025-01-15 DIAGNOSIS — T48.5X5A RHINITIS MEDICAMENTOSA: ICD-10-CM

## 2025-01-15 PROCEDURE — 31231 NASAL ENDOSCOPY DX: CPT | Performed by: OTOLARYNGOLOGY

## 2025-01-15 PROCEDURE — 4010F ACE/ARB THERAPY RXD/TAKEN: CPT | Performed by: OTOLARYNGOLOGY

## 2025-01-15 PROCEDURE — 3008F BODY MASS INDEX DOCD: CPT | Performed by: OTOLARYNGOLOGY

## 2025-01-15 PROCEDURE — 99203 OFFICE O/P NEW LOW 30 MIN: CPT | Performed by: OTOLARYNGOLOGY

## 2025-01-15 PROCEDURE — 1036F TOBACCO NON-USER: CPT | Performed by: OTOLARYNGOLOGY

## 2025-01-15 RX ORDER — HYDRALAZINE HYDROCHLORIDE 50 MG/1
1 TABLET, FILM COATED ORAL
COMMUNITY
Start: 2025-01-08

## 2025-01-15 ASSESSMENT — PATIENT HEALTH QUESTIONNAIRE - PHQ9
1. LITTLE INTEREST OR PLEASURE IN DOING THINGS: NOT AT ALL
2. FEELING DOWN, DEPRESSED OR HOPELESS: NOT AT ALL
SUM OF ALL RESPONSES TO PHQ9 QUESTIONS 1 AND 2: 0

## 2025-01-15 NOTE — PATIENT INSTRUCTIONS
Medsuo 3pcs Nasal Spray Bottle Mist Spray Bottle Clear Empty Rhinitis Care Sprayer Direct Spray Container for Saline Essential Oils - 10ml

## 2025-01-15 NOTE — LETTER
January 15, 2025     Ghulam Rabago DO  [address] [address] [address]  [address]  [place] [address]    Patient: Tc Richter   YOB: 1965   Date of Visit: 1/15/2025       Dear Dr. Ghulam Rabago DO:    Thank you for referring Tc Richter to me for evaluation. Below are my notes for this consultation.  If you have questions, please do not hesitate to call me. I look forward to following your patient along with you.       Sincerely,     Ok Galvez MD      CC: No Recipients  ______________________________________________________________________________________    Referring Provider: Ghulam Rabago DO    History of Present Illness:    Meir Richter is a 59 y.o. male, presenting for evaluation of chronic nasal congestion.  He states that he has been dealing with sinus issues for at least a decade, but most of his life. He has been using nasal saline for his nose and then eventually used afrin, which he states has become an addiction that he has weaned. He is still using afrin daily. He has tried flonase in the past with some relief, but is not currently using. He is also bothered by his sleep issues, which he said the nasal obstruction makes it very challenging to sleep.        Review of Systems:    Review of symptoms was negative except for those stated including Cardiopulmonary, Genitourinary, Gastrointestinal, Psychological, Sleep pattern, Endocrine, Eyes, Neurologic, Musculoskeletal, Skin, Hematologic/Lymphatic and Allergic/Immunologic.     Medical History:    I have reviewed the patient's updated past medical history, surgical history, family history, social history, as well as current medications and allergies as of 1/15/2025. Changes to these items have been updated and marked as reviewed in the electronic medical record.    Physical Exam:    Vitals:  vitals were not taken for this visit.   General: Patient doing well overall and is in no apparent distress.  Psych: Pleasant affect, and answers  questions appropriately.  Head & Face: Symmetric facial movements  Eyes: Pupils equal, round, reactive.  Extraocular movements intact without gaze restrictions or nystagmus. No epiphora.  Ears:  External auditory canals are normal.  Tympanic membranes are clear.  No middle ear effusion is seen.  All middle ear landmarks are normal.  Nose: Anterior rhinoscopy revealed normal sinonasal mucosa. More posterior areas of the nasal cavity could not be completely examined. ITH, b/l; septal deflection to left  Oral Cavity/Oropharynx:  Without lesions or masses to visual exam.  Neck: Supple without lymphadenopathy.  Lungs: Non-labored, and without evidence of stridor.  Cardiac: Pulses are strong, well-perfused.  Extremities: Without gross evidence of clubbing, cyanosis, or edema.  Neuro: Cranial nerves II-XII grossly intact; Intact facial movements.    Results Reviewed:   CT Sinus 12/24 - Pansinusitis with underdeveloped frontal sinuses; mild septal deflection    Procedure:  Rigid nasal endoscopy (84588)  Pre-procedure diagnosis/Indication for procedure:  To evaluate areas not visualized on anterior rhinoscopy   Anesthesia:  None  Description:  A 0-degree 3-mm rigid nasal endoscope was used to examine the left and right nasal cavities.  The nasal valve areas were examined for abnormalities or collapse.  The inferior and middle turbinates were evaluated.  The middle and superior meatuses, and the sphenoethmoid recesses were examined and inspected for mucopurulence and polyps. Once the endoscope was withdrawn, the patient was noted to have tolerated the procedure well without complications and was returned to ambulatory status.    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 [facility] and polypoid appearing middle turbinates, although OMCs limited by  narrowed cavities.     Plan:      - Fluticasone twice daily for 6 weeks  - Continue afrin as needed, but when ready, will begin to dilute afrin with saline until off  - Follow up in 2 months for a repeat examination to discuss the role for surgery  - Will likely need to repeat CT scan after afrin wean if surgery is desired/indicated  - Patient following with outside ENT for sleep management and possible INSPIRE placement in future      Ok Galvez MD, M.Eng.   of Otolaryngology - Head & Neck Surgery  Division of Rhinology and Endoscopic Skull Base Surgery  [facility]   Consent (Scalp)/Introductory Paragraph: The rationale for Mohs was explained to the patient and consent was obtained. The risks, benefits and alternatives to therapy were discussed in detail. Specifically, the risks of changes in hair growth pattern secondary to repair, infection, scarring, bleeding, prolonged wound healing, incomplete removal, allergy to anesthesia, nerve injury and recurrence were addressed. Prior to the procedure, the treatment site was clearly identified and confirmed by the patient. All components of Universal Protocol/PAUSE Rule completed.

## 2025-01-17 ENCOUNTER — LAB (OUTPATIENT)
Dept: LAB | Facility: LAB | Age: 60
End: 2025-01-17
Payer: COMMERCIAL

## 2025-01-17 DIAGNOSIS — I10 ESSENTIAL (PRIMARY) HYPERTENSION: Primary | ICD-10-CM

## 2025-01-17 DIAGNOSIS — D50.9 IRON DEFICIENCY ANEMIA, UNSPECIFIED IRON DEFICIENCY ANEMIA TYPE: ICD-10-CM

## 2025-01-17 LAB
ANION GAP SERPL CALC-SCNC: 14 MMOL/L (ref 10–20)
BASOPHILS # BLD AUTO: 0.04 X10*3/UL (ref 0–0.1)
BASOPHILS NFR BLD AUTO: 0.7 %
BUN SERPL-MCNC: 16 MG/DL (ref 6–23)
CALCIUM SERPL-MCNC: 9.9 MG/DL (ref 8.6–10.3)
CHLORIDE SERPL-SCNC: 104 MMOL/L (ref 98–107)
CO2 SERPL-SCNC: 27 MMOL/L (ref 21–32)
CREAT SERPL-MCNC: 0.9 MG/DL (ref 0.5–1.3)
EGFRCR SERPLBLD CKD-EPI 2021: >90 ML/MIN/1.73M*2
EOSINOPHIL # BLD AUTO: 0.6 X10*3/UL (ref 0–0.7)
EOSINOPHIL NFR BLD AUTO: 9.8 %
ERYTHROCYTE [DISTWIDTH] IN BLOOD BY AUTOMATED COUNT: 13.4 % (ref 11.5–14.5)
FERRITIN SERPL-MCNC: 216 NG/ML (ref 20–300)
GLUCOSE SERPL-MCNC: 140 MG/DL (ref 74–99)
HCT VFR BLD AUTO: 39.5 % (ref 41–52)
HGB BLD-MCNC: 13.2 G/DL (ref 13.5–17.5)
IMM GRANULOCYTES # BLD AUTO: 0.02 X10*3/UL (ref 0–0.7)
IMM GRANULOCYTES NFR BLD AUTO: 0.3 % (ref 0–0.9)
IRON SATN MFR SERPL: 20 % (ref 25–45)
IRON SERPL-MCNC: 89 UG/DL (ref 35–150)
LYMPHOCYTES # BLD AUTO: 1.42 X10*3/UL (ref 1.2–4.8)
LYMPHOCYTES NFR BLD AUTO: 23.1 %
MCH RBC QN AUTO: 30.1 PG (ref 26–34)
MCHC RBC AUTO-ENTMCNC: 33.4 G/DL (ref 32–36)
MCV RBC AUTO: 90 FL (ref 80–100)
MONOCYTES # BLD AUTO: 0.7 X10*3/UL (ref 0.1–1)
MONOCYTES NFR BLD AUTO: 11.4 %
NEUTROPHILS # BLD AUTO: 3.37 X10*3/UL (ref 1.2–7.7)
NEUTROPHILS NFR BLD AUTO: 54.7 %
NRBC BLD-RTO: 0 /100 WBCS (ref 0–0)
PLATELET # BLD AUTO: 260 X10*3/UL (ref 150–450)
POTASSIUM SERPL-SCNC: 3.8 MMOL/L (ref 3.5–5.3)
RBC # BLD AUTO: 4.39 X10*6/UL (ref 4.5–5.9)
SODIUM SERPL-SCNC: 141 MMOL/L (ref 136–145)
TIBC SERPL-MCNC: 446 UG/DL (ref 240–445)
UIBC SERPL-MCNC: 357 UG/DL (ref 110–370)
WBC # BLD AUTO: 6.2 X10*3/UL (ref 4.4–11.3)

## 2025-01-17 PROCEDURE — 85025 COMPLETE CBC W/AUTO DIFF WBC: CPT

## 2025-01-17 PROCEDURE — 83550 IRON BINDING TEST: CPT

## 2025-01-17 PROCEDURE — 82728 ASSAY OF FERRITIN: CPT

## 2025-01-17 PROCEDURE — 80048 BASIC METABOLIC PNL TOTAL CA: CPT

## 2025-01-17 PROCEDURE — 83540 ASSAY OF IRON: CPT

## 2025-01-20 DIAGNOSIS — D50.9 IRON DEFICIENCY ANEMIA, UNSPECIFIED IRON DEFICIENCY ANEMIA TYPE: Primary | ICD-10-CM

## 2025-03-24 ENCOUNTER — OFFICE VISIT (OUTPATIENT)
Dept: HEMATOLOGY/ONCOLOGY | Facility: CLINIC | Age: 60
End: 2025-03-24
Payer: COMMERCIAL

## 2025-03-24 VITALS
RESPIRATION RATE: 16 BRPM | WEIGHT: 250.44 LBS | SYSTOLIC BLOOD PRESSURE: 143 MMHG | HEART RATE: 65 BPM | OXYGEN SATURATION: 96 % | BODY MASS INDEX: 37.96 KG/M2 | TEMPERATURE: 97 F | HEIGHT: 68 IN | DIASTOLIC BLOOD PRESSURE: 75 MMHG

## 2025-03-24 DIAGNOSIS — D50.9 IRON DEFICIENCY ANEMIA, UNSPECIFIED IRON DEFICIENCY ANEMIA TYPE: ICD-10-CM

## 2025-03-24 LAB
ALBUMIN SERPL BCP-MCNC: 4.8 G/DL (ref 3.4–5)
ALP SERPL-CCNC: 41 U/L (ref 33–120)
ALT SERPL W P-5'-P-CCNC: 6 U/L (ref 10–52)
ANION GAP SERPL CALC-SCNC: 13 MMOL/L (ref 10–20)
AST SERPL W P-5'-P-CCNC: 14 U/L (ref 9–39)
BASOPHILS # BLD AUTO: 0.02 X10*3/UL (ref 0–0.1)
BASOPHILS NFR BLD AUTO: 0.4 %
BILIRUB SERPL-MCNC: 0.5 MG/DL (ref 0–1.2)
BUN SERPL-MCNC: 13 MG/DL (ref 6–23)
CALCIUM SERPL-MCNC: 10 MG/DL (ref 8.6–10.3)
CHLORIDE SERPL-SCNC: 102 MMOL/L (ref 98–107)
CO2 SERPL-SCNC: 27 MMOL/L (ref 21–32)
CREAT SERPL-MCNC: 0.98 MG/DL (ref 0.5–1.3)
CRP SERPL-MCNC: 0.34 MG/DL
EGFRCR SERPLBLD CKD-EPI 2021: 89 ML/MIN/1.73M*2
EOSINOPHIL # BLD AUTO: 0.14 X10*3/UL (ref 0–0.7)
EOSINOPHIL NFR BLD AUTO: 2.9 %
ERYTHROCYTE [DISTWIDTH] IN BLOOD BY AUTOMATED COUNT: 13.2 % (ref 11.5–14.5)
ERYTHROCYTE [SEDIMENTATION RATE] IN BLOOD BY WESTERGREN METHOD: 11 MM/H (ref 0–20)
FERRITIN SERPL-MCNC: 191 NG/ML (ref 20–300)
FOLATE SERPL-MCNC: >24 NG/ML
GLUCOSE SERPL-MCNC: 85 MG/DL (ref 74–99)
HAPTOGLOB SERPL NEPH-MCNC: 175 MG/DL (ref 30–200)
HCT VFR BLD AUTO: 37.8 % (ref 41–52)
HGB BLD-MCNC: 12.5 G/DL (ref 13.5–17.5)
HGB RETIC QN: 34 PG (ref 28–38)
IGA SERPL-MCNC: 317 MG/DL (ref 70–400)
IGG SERPL-MCNC: 873 MG/DL (ref 700–1600)
IGM SERPL-MCNC: 46 MG/DL (ref 40–230)
IMM GRANULOCYTES # BLD AUTO: 0.01 X10*3/UL (ref 0–0.7)
IMM GRANULOCYTES NFR BLD AUTO: 0.2 % (ref 0–0.9)
IMMATURE RETIC FRACTION: 11.8 %
IRON SATN MFR SERPL: 25 % (ref 25–45)
IRON SERPL-MCNC: 106 UG/DL (ref 35–150)
LDH SERPL L TO P-CCNC: 104 U/L (ref 84–246)
LYMPHOCYTES # BLD AUTO: 1.06 X10*3/UL (ref 1.2–4.8)
LYMPHOCYTES NFR BLD AUTO: 22.2 %
MCH RBC QN AUTO: 30.6 PG (ref 26–34)
MCHC RBC AUTO-ENTMCNC: 33.1 G/DL (ref 32–36)
MCV RBC AUTO: 92 FL (ref 80–100)
MONOCYTES # BLD AUTO: 0.58 X10*3/UL (ref 0.1–1)
MONOCYTES NFR BLD AUTO: 12.2 %
NEUTROPHILS # BLD AUTO: 2.96 X10*3/UL (ref 1.2–7.7)
NEUTROPHILS NFR BLD AUTO: 62.1 %
NRBC BLD-RTO: ABNORMAL /100{WBCS}
PLATELET # BLD AUTO: 239 X10*3/UL (ref 150–450)
POTASSIUM SERPL-SCNC: 3.8 MMOL/L (ref 3.5–5.3)
PROT SERPL-MCNC: 7.4 G/DL (ref 6.4–8.2)
PROT SERPL-MCNC: 7.4 G/DL (ref 6.4–8.2)
RBC # BLD AUTO: 4.09 X10*6/UL (ref 4.5–5.9)
RETICS #: 0.07 X10*6/UL (ref 0.02–0.12)
RETICS/RBC NFR AUTO: 1.7 % (ref 0.5–2)
SODIUM SERPL-SCNC: 138 MMOL/L (ref 136–145)
TIBC SERPL-MCNC: 430 UG/DL (ref 240–445)
TSH SERPL-ACNC: 2.55 MIU/L (ref 0.44–3.98)
UIBC SERPL-MCNC: 324 UG/DL (ref 110–370)
URATE SERPL-MCNC: 4.9 MG/DL (ref 4–7.5)
VIT B12 SERPL-MCNC: 570 PG/ML (ref 211–911)
WBC # BLD AUTO: 4.8 X10*3/UL (ref 4.4–11.3)

## 2025-03-24 PROCEDURE — 36415 COLL VENOUS BLD VENIPUNCTURE: CPT

## 2025-03-24 PROCEDURE — 99215 OFFICE O/P EST HI 40 MIN: CPT | Mod: 25

## 2025-03-24 PROCEDURE — 3077F SYST BP >= 140 MM HG: CPT

## 2025-03-24 PROCEDURE — 82784 ASSAY IGA/IGD/IGG/IGM EACH: CPT | Mod: GEALAB

## 2025-03-24 PROCEDURE — 83010 ASSAY OF HAPTOGLOBIN QUANT: CPT | Mod: GEALAB

## 2025-03-24 PROCEDURE — 4010F ACE/ARB THERAPY RXD/TAKEN: CPT

## 2025-03-24 PROCEDURE — 86140 C-REACTIVE PROTEIN: CPT

## 2025-03-24 PROCEDURE — 82746 ASSAY OF FOLIC ACID SERUM: CPT | Mod: GEALAB

## 2025-03-24 PROCEDURE — 99205 OFFICE O/P NEW HI 60 MIN: CPT

## 2025-03-24 PROCEDURE — 86334 IMMUNOFIX E-PHORESIS SERUM: CPT | Mod: GEALAB

## 2025-03-24 PROCEDURE — 82728 ASSAY OF FERRITIN: CPT

## 2025-03-24 PROCEDURE — 85045 AUTOMATED RETICULOCYTE COUNT: CPT

## 2025-03-24 PROCEDURE — 83615 LACTATE (LD) (LDH) ENZYME: CPT

## 2025-03-24 PROCEDURE — 3008F BODY MASS INDEX DOCD: CPT

## 2025-03-24 PROCEDURE — 84550 ASSAY OF BLOOD/URIC ACID: CPT

## 2025-03-24 PROCEDURE — 84443 ASSAY THYROID STIM HORMONE: CPT

## 2025-03-24 PROCEDURE — 84155 ASSAY OF PROTEIN SERUM: CPT | Mod: GEALAB

## 2025-03-24 PROCEDURE — 83540 ASSAY OF IRON: CPT

## 2025-03-24 PROCEDURE — 85025 COMPLETE CBC W/AUTO DIFF WBC: CPT

## 2025-03-24 PROCEDURE — 82607 VITAMIN B-12: CPT | Mod: GEALAB

## 2025-03-24 PROCEDURE — 83521 IG LIGHT CHAINS FREE EACH: CPT | Mod: GEALAB

## 2025-03-24 PROCEDURE — 3078F DIAST BP <80 MM HG: CPT

## 2025-03-24 PROCEDURE — 85652 RBC SED RATE AUTOMATED: CPT

## 2025-03-24 PROCEDURE — 80053 COMPREHEN METABOLIC PANEL: CPT

## 2025-03-24 ASSESSMENT — PAIN SCALES - GENERAL: PAINLEVEL_OUTOF10: 0-NO PAIN

## 2025-03-24 NOTE — PROGRESS NOTES
"[facility]    PATIENT VISIT INFORMATION    Visit Type: New Visit    Referring Provider: Ghulam Rabago DO  Reason for referral: Iron deficiency anemia  Primary Practice Provider: Ghulam Rabago DO    CANCER/HEMATOLGOY HISTORY    59-year-old  male presents for evaluation of iron anemia.  Referred by his primary care physician, Dr. Ghulam Rabago.  Consistent anemia has been noted over the last 4 years dating back to May 2020.  Hemoglobin ranging from 7.6-13.2.  Chemistry shows normal liver and kidney function.  ALT slightly decreased several years.  History of diverticulitis and GI bleeds.  Patient has been taking oral iron for many years along with multivitamins.  Medical history includes hypertension, atherosclerotic heart disease, hypercholesteremia, A-fib, sinus inflammation, diabetes mellitus, class II obesity, bariatric surgery Haydee-en-Y 2018, diverticulosis, bowel resection, malabsorption, gastritis, acid reflux, gout, cervical radiculopathy, cardiac stent, active asthma, WM.    Emergency room and hospitalizations:  Patient reports fall in November of 7/20/2024 on Brilinta.  Head CT negative for hemorrhage fracture or other acute trauma or abnormality.  CT C-spine mild asymmetric widening of the left C4-C5.  Degenerative etiology.  No fracture or traumatic issues identified.    Hospitalized 4/1/2021 diverticulitis and GI bleed.  Started on PPI.    Colonoscopy 10/12/2022 diverticulosis of the large intestine without perforation.  EGD 4/2/2021 Haydee-en-Y anastomosis characterized congestion with erythema and friable mucosa.  Advised to not take any NSAIDs.  Colonoscopy 4/2/2021 shows diverticulosis in sigmoid colon.  Red blood clots washed with diverticula.    HISTORY OF PRESENT ILLNESS     ID Statement: Meir Richter is a 59-year-old male    Chief Complaint: \"tiredness\"     Interval History:   Presents with significant other Marley.   He notes ongoing orthostatic " dizziness. SOB with exertion. Less active than he should be. Wore heart monitor for abnormal rhythm and following cardiology.  Dark stool occasional.  History of GI bleed.  Tiered at times. Able to do chores. Lays down afternoon for tiredness. Eats healthy and watching portion.    Denies F/C/recent illness/infection, drenching night sweats, unintentional weight loss, anorexia/loss of appetite, HA, dizziness, lightheadedness, PICA, acute changes in vision/hearing, palpitations, CP, SOB, n/v/d/c/abd pain, bleeding, urinary issues, haematuria, LAD, peripheral neuropathy, bone pain, bruising, sores, or rashes.      PAST/CURRENT HISTORY     MEDICAL/SURGICAL HISTORY  Past Medical History:   Diagnosis Date    Coronary artery disease     Diabetes mellitus (Multi)     Hypertension     Morbid (severe) obesity due to excess calories (Multi) 11/01/2016    Morbid obesity    Other allergy status, other than to drugs and biological substances     Environmental allergies    Other conditions influencing health status     Ulcer    Palpitations     Heart palpitations    Personal history of other diseases of the circulatory system 04/09/2021    History of malignant hypertension    Personal history of other diseases of the circulatory system     History of atrial fibrillation    Personal history of other diseases of the circulatory system     History of cardiac murmur    Personal history of other diseases of the digestive system 04/07/2014    History of gastroesophageal reflux (GERD)    Personal history of other diseases of the digestive system 04/07/2014    History of diverticulitis of colon    Personal history of other diseases of the musculoskeletal system and connective tissue     History of gout    Personal history of other diseases of the nervous system and sense organs 04/07/2014    History of sleep apnea    Personal history of other diseases of the nervous system and sense organs     History of glaucoma    Personal history of  other diseases of the respiratory system     History of bronchitis    Personal history of other endocrine, nutritional and metabolic disease     History of diabetes mellitus    Personal history of other endocrine, nutritional and metabolic disease     History of obesity    Personal history of other medical treatment     History of stress test    Personal history of other specified conditions     History of headache    Personal history of other specified conditions     History of impaired glucose tolerance      Past Surgical History:   Procedure Laterality Date    CARDIAC CATHETERIZATION N/A 10/4/2023    Procedure: Left Heart Cath;  Surgeon: Jase Kumar MD;  Location: [facility] Cardiac Cath Lab;  Service: Cardiovascular;  Laterality: N/A;    CARDIAC CATHETERIZATION N/A 10/4/2023    Procedure: PCI;  Surgeon: Jaswinder Botello MD;  Location: [facility] Cardiac Cath Lab;  Service: Cardiovascular;  Laterality: N/A;    COLECTOMY PARTIAL / TOTAL  2014    Partial Colectomy - Sigmoid    COLONOSCOPY  2017    Colonoscopy    HAND SURGERY  08/15/2016    Hand Surgery                                                                                                                                                          STOMACH SURGERY  2017    Gastric Surgery        SOCIAL HISTORY  -Lives with significant other, Marley   -Work place: Retired [employer]    -Tobacco/smokeless use: Denies (hx chewing tobacco quit 7 years ago)   -Alcohol: Max 5-6 bourbon per week or 12 beers per week  -Illicit drug or marijuana use: Denies   -Anabaptism or Spiritual beliefs: Denies   -Social Determinates of Health Concerns:    FAMILY HISTORY  -Mom  age 43 y/o Leukemia   -Sister cardiomyopathy   -Dad  heart attach, hx colon issues unsure diagnoses  -No other known history of hematologic, bleeding, clotting, autoimmune, genetic, or malignant disorders in the family.     OCCUPATIONAL/ENVIRONMENTAL  HISTORY/EXPOSURES:  -None reported    Active Problems, Allergy List, Medication List, and PMH/PSH/FH/Social Hx have been reviewed and reconciled in chart. Updates made when necessary.     REVIEW OF SYSTEMS   A review of systems has been completed and are negative for complaints except what is stated in the assessment, HPI, IH, ROS, and/or past medical history.    ALLERGIES AND MEDICATIONS     Allergies and Intolerances:   No Known Allergies   Medication Profile:   Current Outpatient Medications   Medication Instructions    albuterol 90 mcg/actuation inhaler 1-2 puffs, inhalation, As needed, Every 4 to 6 hours    amLODIPine (NORVASC) 5 mg, oral, 2 times daily    ascorbic acid, vitamin C, 500 mg capsule 1 capsule, Daily    aspirin 81 mg EC tablet Aspirin 81 MG TABS   Refills: 0       Active    benazepril (LOTENSIN) 20 mg, oral, 2 times daily    calcium cit/mag/D3/Zn//jarod (CALCIUM CITRATE PLUS ORAL) 1 tablet, 2 times daily    cyanocobalamin (Vitamin B-12) 500 mcg tablet Vitamin B 12 500 MCG Oral Tablet   Refills: 0        Start : 25-Nov-2020   Active    fenofibrate (Tricor) 145 mg tablet TAKE 1 TABLET DAILY    ferrous sulfate 325 (65 Fe) MG tablet 1 tablet, Daily    glucosamine/chondroitin/C/jarod (GLUCOSAM ALEXANDER DIP-CHONDROIT-C-MN ORAL) 1 capsule, Daily    hydrALAZINE (Apresoline) 50 mg tablet 1 tablet, Every 12 hours scheduled (0630,1830)    hydroCHLOROthiazide (HYDRODIURIL) 25 mg, oral, Daily    isosorbide mononitrate ER (IMDUR) 30 mg, Daily before breakfast    L. acidophilus/Bifid. animalis 32 billion cell capsule 1 capsule, Daily    lovastatin (Mevacor) 10 mg tablet TAKE 1 TABLET AT BEDTIME    metFORMIN (Glucophage) 500 mg tablet TAKE 1 TABLET TWICE A DAY    multivitamin tablet 1 tablet, 2 times daily    nebivolol (BYSTOLIC) 5 mg, oral, Nightly    nitroglycerin (NITROSTAT) 0.4 mg, Every 5 min PRN    pantoprazole (PROTONIX) 40 mg, oral, Daily    semaglutide 0.5 mg, subcutaneous, Weekly    ticagrelor (Brilinta) 90  "mg tablet 1 tablet, 2 times daily    VITAMIN B COMPLEX ORAL 1 tablet, Daily      Available Vaccination Record:   Immunization History   Administered Date(s) Administered    DTaP vaccine, pediatric  (INFANRIX) 11/29/2012    DTaP, Unspecified 11/29/2012    Flu vaccine (IIV4), preservative free *Check age/dose* 10/31/2016    Flu vaccine, trivalent, preservative free, age 6 months and greater (Fluarix/Fluzone/Flulaval) 10/01/2024    Influenza, seasonal, injectable 10/26/2007, 01/18/2010, 11/22/2010, 11/29/2012, 12/02/2014, 10/26/2022    Moderna SARS-CoV-2 Vaccination 04/20/2021, 05/20/2021, 12/15/2021    Pneumococcal conjugate vaccine, 13-valent (PREVNAR 13) 10/31/2016    Pneumococcal polysaccharide vaccine, 23-valent, age 2 years and older (PNEUMOVAX 23) 06/26/2008      PHYSICAL EXAM     Vital Signs/Measurements:       11/7/2024    10:30 PM 11/7/2024    10:45 PM 11/7/2024    11:57 PM 11/27/2024     2:57 PM 1/8/2025     8:30 AM 1/15/2025    11:21 AM 3/24/2025    10:49 AM   Vitals   Systolic 131 133 130 165 170  143   Diastolic 90 76 88 84 95  75   BP Location       Left arm   Heart Rate 63 60 69 64 66  65   Temp       36.1 °C (97 °F)   Resp 15 16 17    16   Height      1.727 m (5' 8\") 1.722 m (5' 7.8\")    Weight (lb)    249 245 240 250.44   BMI    39 kg/m2 38.37 kg/m2 36.49 kg/m2 38.31 kg/m2   BSA (m2)    2.31 m2 2.29 m2 2.29 m2 2.34 m2   Visit Report    Report Report Report Report       Significant value      Performance:   ECOG Performance Status: 1     Grade ECOG performance status   0 Fully active, able to carry on all pre-disease performance without restriction   1 Restricted in physically strenuous activity but ambulatory and able to carry out work of a light or sedentary nature, e.g., light housework, office work   2 Ambulatory and capable of all selfcare but unable to carry out any work activities; Up and about more than 50% of waking hours   3 Capable of only limited selfcare, confined to bed or chair more than " 50% of waking hours   4 Completely disabled; Cannot carry out any selfcare; Totally confined to bed or chair   5 Dead     Physical Exam:  General: Patient is awake/alert/oriented x3, no distress, nourished, hydrated, and cooperative, ambulating without difficulty, obese  Skin: Expected color for ethnicity, good turgor, dry, no prominent lesions, rashes, unusual bruising, or bleeding   Hair: Normal texture and distribution   Nails: Normal color, no deformities    HEENT:   Head: Normocephalic, atraumatic, no visible masses  Eyes: Conjunctiva clear, sclera non-icteric, no exudates or hemorrhages   Ears: nl appearance, hearing intact    Nose: no external lesions, mucosa non-inflamed, no rhinorrhea   Mouth: Mucous membranes moist, no lesions, sores, bleeding, or erythema     Head/Neck: Neck supple, no apparent injury, thyroid without mass or tenderness, No JVD, trachea midline, no bruits appreciated    Respiratory/Thorax: Patent airways, CTAB, chest symmetry, normal inspiratory and expiratory effort    Cardiovascular: Regular rate and rhythm, no murmur or gallop, no carotid bruit or thrills    Gastrointestinal: Bowel sounds normal, non-distended, soft, no tenderness, no masses or hernia, or organomegaly appreciated due to body habitus   Genitourinary: Deferred   Musculoskeletal: Normal gait, normal range of motion, no pain on palpation of spine, no deformity, normal strength, no atrophy, and no CVA tenderness appreciated   Extremities: No amputations or deformities, cyanosis, edema or viscosities, peripheral pulses intact   Neurological: Sensation present to touch, intact senses, motor response and reflexes normal, normal strength   Breast;   Lymphatic: No significant lymphadenopathy   Psychological: Intact recent and remote memory, judgement, and insight. Appropriate mood, affect, and behavior          RESULTS/DATA     Labs:     Lab Results   Component Value Date    WBC 6.2 01/17/2025    NEUTROABS 3.37 01/17/2025     IGABSOL 0.02 01/17/2025    LYMPHSABS 1.42 01/17/2025    MONOSABS 0.70 01/17/2025    EOSABS 0.60 01/17/2025    BASOSABS 0.04 01/17/2025    RBC 4.39 (L) 01/17/2025    MCV 90 01/17/2025    MCHC 33.4 01/17/2025    HGB 13.2 (L) 01/17/2025    HCT 39.5 (L) 01/17/2025     01/17/2025       Lab Results   Component Value Date    CREATININE 0.90 01/17/2025    BUN 16 01/17/2025    EGFR >90 01/17/2025     01/17/2025    K 3.8 01/17/2025     01/17/2025    CO2 27 01/17/2025      Lab Results   Component Value Date    ALT 7 (L) 10/03/2024    AST 15 10/03/2024    ALKPHOS 41 10/03/2024    BILITOT 0.5 10/03/2024      Lab Results   Component Value Date    TSH 1.78 05/22/2020     Lab Results   Component Value Date    TSH 1.78 05/22/2020     Lab Results   Component Value Date    IRON 89 01/17/2025    TIBC 446 (H) 01/17/2025    FERRITIN 216 01/17/2025      Lab Results   Component Value Date    RHOGFRLE18 1,343 (H) 10/03/2024      Lab Results   Component Value Date    FOLATE >24.0 10/26/2022       Lab Results   Component Value Date    CRP 3.05 (A) 06/26/2018        Lab Results   Component Value Date    SPEP Normal. 10/23/2023       Radiology/Studies:   Please see above    ASSESSMENT/PLAN     Assessment and Plan:   #1.  Iron deficiency anemia  59-year-old  male presents for evaluation of iron anemia.  Referred by his primary care physician, Dr. Ghulam Rabago.  Consistent anemia has been noted over the last 4 years dating back to May 2020.  Hemoglobin ranging from 7.6-13.2.  Chemistry shows normal liver and kidney function.  ALT slightly decreased several years.  History of diverticulitis and GI bleeds.  Patient has been taking oral iron for many years along with multivitamins.    Medical history includes hypertension, atherosclerotic heart disease, hypercholesteremia, A-fib, sinus inflammation, diabetes mellitus, class II obesity, bariatric surgery Haydee-en-Y 2018, diverticulosis, bowel resection, malabsorption,  gastritis, acid reflux, gout, cervical radiculopathy, cardiac stent, active asthma, WM.    Workup:  Discussed workup with patient.  Reassess nutritional labs, inflammatory markers, SPEP, TSH, uric acid, kappa light chain, heavy chain, haptoglobin, reticulocytes, LDH.  Patient drinks evenings either 5-6 bourbon per week or 12 beers.  Also chews tobacco occasionally during stress.   Discussed likely malabsorption due to Haydee-en-Y.  If possible he would prefer to stay on oral iron.  He is willing to have IV iron if it is needed.  Patient states he will be seeing nephrology for uncontrolled blood pressure.    Discussion of Workup:    **Please follow with specialties as scheduled for other health needs and routine screenings.**    Follow up:    RTC:  -2 weeks to discuss workup    Medications:  -Continue oral iron at this time    Imaging/Testing:  -Ultrasound of the abdomen to assess liver and spleen    Referral(s):  -N/A    Other Pertinent Appointments:  -ENT 3/26/2025  -Primary care 4/7/2025  -Nephrology 4/30/2025      Patient Discussion Summary:  Discussed plan of care in detail. Patient states understanding and in agreement to the plan. Answered all questions to there liking. The patient will call with any additional questions and/or concerns.    Thank you for allowing me to participate in your care. It was a pleasure meeting you.    Sincerely,  JOSE GUADALUPE Coronado-CNP     Hematology/Oncology Clinical Nurse Practitioner     [facility]   [id number]  [address]  [place] [id number]  Office #: [phone]    DISCLAIMER:   In preparing for this visit and writing this note, I reviewed all the previous electronic medical records (testing, labs, imaging, and other procedures, provider notes, and medical charts) of the patient available in the physician portal pertinent to patient care. Significant findings which helped in decision making are recorded in this chart.    This  document may have been written by voice recognition software.  There may be some incorrect wording, spelling and/or spelling errors or punctuation errors that were not corrected prior to committing the note to the medical record. Please request clarification if there is documentation error or clarification is needed.   Time based billing: Please see documentation within this specific encounter.

## 2025-03-25 LAB
KAPPA LC SERPL-MCNC: 2.48 MG/DL (ref 0.33–1.94)
KAPPA LC/LAMBDA SER: 1.41 {RATIO} (ref 0.26–1.65)
LAMBDA LC SERPL-MCNC: 1.76 MG/DL (ref 0.57–2.63)

## 2025-03-26 ENCOUNTER — APPOINTMENT (OUTPATIENT)
Dept: OTOLARYNGOLOGY | Facility: CLINIC | Age: 60
End: 2025-03-26
Payer: COMMERCIAL

## 2025-03-26 VITALS — BODY MASS INDEX: 36.37 KG/M2 | WEIGHT: 240 LBS | HEIGHT: 68 IN

## 2025-03-26 DIAGNOSIS — J31.0 RHINITIS MEDICAMENTOSA: ICD-10-CM

## 2025-03-26 DIAGNOSIS — J32.9 CHRONIC RHINOSINUSITIS: Primary | ICD-10-CM

## 2025-03-26 DIAGNOSIS — T48.5X5A RHINITIS MEDICAMENTOSA: ICD-10-CM

## 2025-03-26 PROCEDURE — 3008F BODY MASS INDEX DOCD: CPT | Performed by: OTOLARYNGOLOGY

## 2025-03-26 PROCEDURE — 1036F TOBACCO NON-USER: CPT | Performed by: OTOLARYNGOLOGY

## 2025-03-26 PROCEDURE — 31231 NASAL ENDOSCOPY DX: CPT | Performed by: OTOLARYNGOLOGY

## 2025-03-26 PROCEDURE — 99213 OFFICE O/P EST LOW 20 MIN: CPT | Performed by: OTOLARYNGOLOGY

## 2025-03-26 PROCEDURE — 4010F ACE/ARB THERAPY RXD/TAKEN: CPT | Performed by: OTOLARYNGOLOGY

## 2025-03-26 RX ORDER — BENZOCAINE .13; .15; .5; 2 G/100G; G/100G; G/100G; G/100G
1-2 GEL ORAL DAILY
Qty: 25.8 G | Refills: 3 | Status: SHIPPED | OUTPATIENT
Start: 2025-03-26 | End: 2026-03-26

## 2025-03-26 NOTE — PROGRESS NOTES
"Referring Provider: No ref. provider found    History of Present Illness:    Meir Richter is a 59 y.o. male, presenting for follow-up for rhinitis medicamentosa and nasal obstruction.  He states that since his last appointment he is feels significantly better.  He is achieved great success with the Afrin weaning protocol.  He states he is only now using a solution of 10% Afrin at night occasionally.  He has been tolerating the Flonase without difficulty.  ?  Review of Systems:    Review of symptoms was negative except for those stated including Cardiopulmonary, Genitourinary, Gastrointestinal, Psychological, Sleep pattern, Endocrine, Eyes, Neurologic, Musculoskeletal, Skin, Hematologic/Lymphatic and Allergic/Immunologic.     Medical History:    I have reviewed the patient's updated past medical history, surgical history, family history, social history, as well as current medications and allergies as of 3/26/2025. Changes to these items have been updated and marked as reviewed in the electronic medical record.    Physical Exam:    Vitals:  height is 1.727 m (5' 8\") and weight is 109 kg (240 lb).   General: Patient doing well overall and is in no apparent distress.  Psych: Pleasant affect, and answers questions appropriately.  Head & Face: Symmetric facial movements  Eyes: Pupils equal, round, reactive.  Extraocular movements intact without gaze restrictions or nystagmus. No epiphora.  Ears:  External auditory canals are normal.  Tympanic membranes are clear.  No middle ear effusion is seen.  All middle ear landmarks are normal.  Nose: Anterior rhinoscopy revealed normal sinonasal mucosa. More posterior areas of the nasal cavity could not be completely examined.  Oral Cavity/Oropharynx:  Without lesions or masses to visual exam.  Neck: Supple without lymphadenopathy.  Lungs: Non-labored, and without evidence of stridor.  Cardiac: Pulses are strong, well-perfused.  Extremities: Without gross evidence of " clubbing, cyanosis, or edema.  Neuro: Cranial nerves II-XII grossly intact; Intact facial movements.    Procedure:  Rigid nasal endoscopy (74594)  Pre-procedure diagnosis/Indication for procedure:  To evaluate areas not visualized on anterior rhinoscopy   Anesthesia:  None  Description:  A 0-degree 3-mm rigid nasal endoscope was used to examine the left and right nasal cavities.  The nasal valve areas were examined for abnormalities or collapse.  The inferior and middle turbinates were evaluated.  The middle and superior meatuses, and the sphenoethmoid recesses were examined and inspected for mucopurulence and polyps. Once the endoscope was withdrawn, the patient was noted to have tolerated the procedure well without complications and was returned to ambulatory status.    Findings:    There is bilateral nasal mucosal dryness and some mild crusting anteriorly.  More posteriorly his exam has improved significantly.  I am able to see into the middle meatus which is clear bilaterally.  The nasopharynx is clear bilaterally.  The sphenoethmoidal recess is clear bilaterally.  Overall significant improvement in nasal airway.      Assessment:     Meir Richter is a 59 y.o. male with nasal obstruction and rhinitis medicamentosa, significantly improved.    Plan:      Tc has improved objectively and subjectively with respect to his nasal obstruction.  We will plan to change his take his own to Rhinocort to see if it dries out his nose less while maintaining the same level of symptoms.  He is going camping for several months this summer so we will plan to see him back when he returns.      Ok Galvez MD, M.Eng.   of Otolaryngology - Head & Neck Surgery  Division of Rhinology and Endoscopic Skull Base Surgery  [facility]

## 2025-03-27 LAB
ALBUMIN: 4.7 G/DL (ref 3.4–5)
ALPHA 1 GLOBULIN: 0.3 G/DL (ref 0.2–0.6)
ALPHA 2 GLOBULIN: 0.7 G/DL (ref 0.4–1.1)
BETA GLOBULIN: 1 G/DL (ref 0.5–1.2)
GAMMA GLOBULIN: 0.8 G/DL (ref 0.5–1.4)
IMMUNOFIXATION COMMENT: NORMAL
PATH REVIEW - SERUM IMMUNOFIXATION: NORMAL
PATH REVIEW-SERUM PROTEIN ELECTROPHORESIS: NORMAL
PROTEIN ELECTROPHORESIS COMMENT: NORMAL

## 2025-03-28 ENCOUNTER — TELEPHONE (OUTPATIENT)
Dept: OTOLARYNGOLOGY | Facility: CLINIC | Age: 60
End: 2025-03-28
Payer: COMMERCIAL

## 2025-03-28 NOTE — TELEPHONE ENCOUNTER
[facility] and has requested to switch patient medication to generic Budesonide 32mcg. Medication order has been altered with pharmacist to generic Budesonide 32mcg 1-2 sprays daily, instructions remain as originally prescribed to patient. Dr. Galvez notified of switch to the generic for patient.

## 2025-04-02 ENCOUNTER — APPOINTMENT (OUTPATIENT)
Dept: RADIOLOGY | Facility: HOSPITAL | Age: 60
End: 2025-04-02
Payer: COMMERCIAL

## 2025-04-03 ENCOUNTER — HOSPITAL ENCOUNTER (OUTPATIENT)
Dept: RADIOLOGY | Facility: HOSPITAL | Age: 60
Discharge: HOME | End: 2025-04-03
Payer: COMMERCIAL

## 2025-04-03 DIAGNOSIS — D50.9 IRON DEFICIENCY ANEMIA, UNSPECIFIED IRON DEFICIENCY ANEMIA TYPE: ICD-10-CM

## 2025-04-03 PROCEDURE — 76700 US EXAM ABDOM COMPLETE: CPT

## 2025-04-07 ENCOUNTER — APPOINTMENT (OUTPATIENT)
Dept: PRIMARY CARE | Facility: CLINIC | Age: 60
End: 2025-04-07
Payer: COMMERCIAL

## 2025-04-08 ENCOUNTER — TELEMEDICINE (OUTPATIENT)
Dept: HEMATOLOGY/ONCOLOGY | Facility: CLINIC | Age: 60
End: 2025-04-08
Payer: COMMERCIAL

## 2025-04-08 DIAGNOSIS — D50.8 IRON DEFICIENCY ANEMIA SECONDARY TO INADEQUATE DIETARY IRON INTAKE: Primary | ICD-10-CM

## 2025-04-08 DIAGNOSIS — K90.9 INTESTINAL MALABSORPTION, UNSPECIFIED TYPE (HHS-HCC): ICD-10-CM

## 2025-04-08 DIAGNOSIS — D50.9 IRON DEFICIENCY ANEMIA, UNSPECIFIED IRON DEFICIENCY ANEMIA TYPE: ICD-10-CM

## 2025-04-08 PROBLEM — K90.49 MALABSORPTION DUE TO INTOLERANCE, NOT ELSEWHERE CLASSIFIED: Status: ACTIVE | Noted: 2023-03-27

## 2025-04-08 PROCEDURE — 99214 OFFICE O/P EST MOD 30 MIN: CPT

## 2025-04-08 PROCEDURE — 4010F ACE/ARB THERAPY RXD/TAKEN: CPT

## 2025-04-08 RX ORDER — HEPARIN 100 UNIT/ML
500 SYRINGE INTRAVENOUS AS NEEDED
OUTPATIENT
Start: 2025-04-08

## 2025-04-08 RX ORDER — ALBUTEROL SULFATE 0.83 MG/ML
3 SOLUTION RESPIRATORY (INHALATION) AS NEEDED
OUTPATIENT
Start: 2025-04-15

## 2025-04-08 RX ORDER — FAMOTIDINE 10 MG/ML
20 INJECTION, SOLUTION INTRAVENOUS ONCE AS NEEDED
OUTPATIENT
Start: 2025-04-15

## 2025-04-08 RX ORDER — HEPARIN SODIUM,PORCINE/PF 10 UNIT/ML
50 SYRINGE (ML) INTRAVENOUS AS NEEDED
OUTPATIENT
Start: 2025-04-08

## 2025-04-08 RX ORDER — EPINEPHRINE 0.3 MG/.3ML
0.3 INJECTION SUBCUTANEOUS EVERY 5 MIN PRN
OUTPATIENT
Start: 2025-04-15

## 2025-04-08 RX ORDER — DIPHENHYDRAMINE HYDROCHLORIDE 50 MG/ML
50 INJECTION, SOLUTION INTRAMUSCULAR; INTRAVENOUS AS NEEDED
OUTPATIENT
Start: 2025-04-15

## 2025-04-08 NOTE — PROGRESS NOTES
[facility] [place]    PATIENT VISIT INFORMATION    Visit Type: Follow up visit   I performed this visit using real-time telehealth tools, including an audio/video connection between (Meir Richter at home) and (DIRK Coronado at [facility])  Patient consents to telemedicine service today and understands the limitations of the visit, no physical examination and all issues may not be able to be addressed today, other than brief neuro and psych assessment.   Referring Provider: Ghulam Rabago DO  Reason for referral: Iron deficiency anemia  Primary Practice Provider: Ghulam Rabago DO    CANCER/HEMATOLGOY HISTORY    59-year-old  male presents for evaluation of iron anemia.  Referred by his primary care physician, Dr. Ghulam Rabago.  Consistent anemia has been noted over the last 4 years dating back to May 2020.  Hemoglobin ranging from 7.6-13.2.  Chemistry shows normal liver and kidney function.  ALT slightly decreased several years.  History of diverticulitis and GI bleeds.  Patient has been taking oral iron for many years along with multivitamins.  Medical history includes hypertension, atherosclerotic heart disease, hypercholesteremia, A-fib, sinus inflammation, diabetes mellitus, class II obesity, bariatric surgery Haydee-en-Y 2018, diverticulosis, bowel resection, malabsorption, gastritis, acid reflux, gout, cervical radiculopathy, cardiac stent, active asthma, WM.    Emergency room and hospitalizations:  Patient reports fall in November of 7/20/2024 on Brilinta.  Head CT negative for hemorrhage fracture or other acute trauma or abnormality.  CT C-spine mild asymmetric widening of the left C4-C5.  Degenerative etiology.  No fracture or traumatic issues identified.    Hospitalized 4/1/2021 diverticulitis and GI bleed.  Started on PPI.    Colonoscopy 10/12/2022 diverticulosis of the large intestine without perforation.  EGD 4/2/2021 Haydee-en-Y anastomosis characterized  "congestion with erythema and friable mucosa.  Advised to not take any NSAIDs.  Colonoscopy 4/2/2021 shows diverticulosis in sigmoid colon.  Red blood clots washed with diverticula.    HISTORY OF PRESENT ILLNESS     ID Statement: Meir Richter is a 60-year-old male    Chief Complaint: \"tired\"     Interval History:   Presents present virtually to review labs.   He notes ongoing orthostatic dizziness. SOB with exertion. Less active than he should be. Wore heart monitor for abnormal rhythm and following cardiology.  Dark stool occasional.  History of GI bleed.  Tiered at times. Able to do chores. Lays down afternoon for tiredness. Eats healthy and watching portion.    Denies F/C/recent illness/infection, drenching night sweats, unintentional weight loss, anorexia/loss of appetite, HA, dizziness, lightheadedness, PICA, acute changes in vision/hearing, palpitations, CP, SOB, n/v/d/c/abd pain, bleeding, urinary issues, haematuria, LAD, peripheral neuropathy, bone pain, bruising, sores, or rashes.      PAST/CURRENT HISTORY     MEDICAL/SURGICAL HISTORY  Past Medical History:   Diagnosis Date    Coronary artery disease     Diabetes mellitus (Multi)     Hypertension     Morbid (severe) obesity due to excess calories (Multi) 11/01/2016    Morbid obesity    Other allergy status, other than to drugs and biological substances     Environmental allergies    Other conditions influencing health status     Ulcer    Palpitations     Heart palpitations    Personal history of other diseases of the circulatory system 04/09/2021    History of malignant hypertension    Personal history of other diseases of the circulatory system     History of atrial fibrillation    Personal history of other diseases of the circulatory system     History of cardiac murmur    Personal history of other diseases of the digestive system 04/07/2014    History of gastroesophageal reflux (GERD)    Personal history of other diseases of the digestive system " 04/07/2014    History of diverticulitis of colon    Personal history of other diseases of the musculoskeletal system and connective tissue     History of gout    Personal history of other diseases of the nervous system and sense organs 04/07/2014    History of sleep apnea    Personal history of other diseases of the nervous system and sense organs     History of glaucoma    Personal history of other diseases of the respiratory system     History of bronchitis    Personal history of other endocrine, nutritional and metabolic disease     History of diabetes mellitus    Personal history of other endocrine, nutritional and metabolic disease     History of obesity    Personal history of other medical treatment     History of stress test    Personal history of other specified conditions     History of headache    Personal history of other specified conditions     History of impaired glucose tolerance      Past Surgical History:   Procedure Laterality Date    CARDIAC CATHETERIZATION N/A 10/4/2023    Procedure: Left Heart Cath;  Surgeon: Jase Kumar MD;  Location: [facility] Cardiac Cath Lab;  Service: Cardiovascular;  Laterality: N/A;    CARDIAC CATHETERIZATION N/A 10/4/2023    Procedure: PCI;  Surgeon: Jaswinder Botello MD;  Location: [facility] Cardiac Cath Lab;  Service: Cardiovascular;  Laterality: N/A;    COLECTOMY PARTIAL / TOTAL  04/02/2014    Partial Colectomy - Sigmoid    COLONOSCOPY  02/09/2017    Colonoscopy    HAND SURGERY  08/15/2016    Hand Surgery                                                                                                                                                          STOMACH SURGERY  02/09/2017    Gastric Surgery        SOCIAL HISTORY  -Lives with significant other, Marley   -Work place: Retired [employer]    -Tobacco/smokeless use: Denies (hx chewing tobacco quit 7 years ago)   -Alcohol: Max 5-6 bourbon per week or 12 beers per week  -Illicit drug or marijuana use: Denies    -Mu-ism or Spiritual beliefs: Denies   -Social Determinates of Health Concerns:    FAMILY HISTORY  -Mom  age 41 y/o Leukemia   -Sister cardiomyopathy   -Dad  heart attach, hx colon issues unsure diagnoses  -No other known history of hematologic, bleeding, clotting, autoimmune, genetic, or malignant disorders in the family.     OCCUPATIONAL/ENVIRONMENTAL HISTORY/EXPOSURES:  -None reported    Active Problems, Allergy List, Medication List, and PMH/PSH/FH/Social Hx have been reviewed and reconciled in chart. Updates made when necessary.     REVIEW OF SYSTEMS   A review of systems has been completed and are negative for complaints except what is stated in the assessment, HPI, IH, ROS, and/or past medical history.    ALLERGIES AND MEDICATIONS     Allergies and Intolerances:   No Known Allergies   Medication Profile:   Current Outpatient Medications   Medication Instructions    albuterol 90 mcg/actuation inhaler 1-2 puffs, inhalation, As needed, Every 4 to 6 hours    amLODIPine (NORVASC) 5 mg, oral, 2 times daily    ascorbic acid, vitamin C, 500 mg capsule 1 capsule, Daily    aspirin 81 mg EC tablet Aspirin 81 MG TABS   Refills: 0       Active    benazepril (LOTENSIN) 20 mg, oral, 2 times daily    budesonide (Rhinocort AQ) 32 mcg/actuation nasal spray 1-2 sprays, Each Nostril, Daily    calcium cit/mag/D3/Zn//jarod (CALCIUM CITRATE PLUS ORAL) 1 tablet, 2 times daily    cyanocobalamin (Vitamin B-12) 500 mcg tablet Vitamin B 12 500 MCG Oral Tablet   Refills: 0        Start : 2020   Active    fenofibrate (Tricor) 145 mg tablet TAKE 1 TABLET DAILY    ferrous sulfate 325 (65 Fe) MG tablet 1 tablet, Daily    glucosamine/chondroitin/C/jarod (GLUCOSAM ALEXANDER DIP-CHONDROIT-C-MN ORAL) 1 capsule, Daily    hydrALAZINE (Apresoline) 50 mg tablet 1 tablet, Every 12 hours scheduled (0630,1830)    hydroCHLOROthiazide (HYDRODIURIL) 25 mg, oral, Daily    isosorbide mononitrate ER (IMDUR) 30 mg, Daily before breakfast  "   L. acidophilus/Bifid. animalis 32 billion cell capsule 1 capsule, Daily    lovastatin (Mevacor) 10 mg tablet TAKE 1 TABLET AT BEDTIME    metFORMIN (Glucophage) 500 mg tablet TAKE 1 TABLET TWICE A DAY    multivitamin tablet 1 tablet, 2 times daily    nebivolol (BYSTOLIC) 5 mg, oral, Nightly    nitroglycerin (NITROSTAT) 0.4 mg, Every 5 min PRN    pantoprazole (PROTONIX) 40 mg, oral, Daily    semaglutide 0.5 mg, subcutaneous, Weekly    ticagrelor (Brilinta) 90 mg tablet 1 tablet, 2 times daily    VITAMIN B COMPLEX ORAL 1 tablet, Daily      Available Vaccination Record:   Immunization History   Administered Date(s) Administered    DTaP vaccine, pediatric  (INFANRIX) 11/29/2012    DTaP, Unspecified 11/29/2012    Flu vaccine (IIV4), preservative free *Check age/dose* 10/31/2016    Flu vaccine, trivalent, preservative free, age 6 months and greater (Fluarix/Fluzone/Flulaval) 10/01/2024    Influenza, seasonal, injectable 10/26/2007, 01/18/2010, 11/22/2010, 11/29/2012, 12/02/2014, 10/26/2022    Moderna SARS-CoV-2 Vaccination 04/20/2021, 05/20/2021, 12/15/2021    Pneumococcal conjugate vaccine, 13-valent (PREVNAR 13) 10/31/2016    Pneumococcal polysaccharide vaccine, 23-valent, age 2 years and older (PNEUMOVAX 23) 06/26/2008      PHYSICAL EXAM     Vital Signs/Measurements:       11/7/2024    10:45 PM 11/7/2024    11:57 PM 11/27/2024     2:57 PM 1/8/2025     8:30 AM 1/15/2025    11:21 AM 3/24/2025    10:49 AM 3/26/2025    10:59 AM   Vitals   Systolic 133 130 165 170  143    Diastolic 76 88 84 95  75    BP Location      Left arm    Heart Rate 60 69 64 66  65    Temp      36.1 °C (97 °F)    Resp 16 17    16    Height     1.727 m (5' 8\") 1.722 m (5' 7.8\")  1.727 m (5' 8\")   Weight (lb)   249 245 240 250.44 240   BMI   39 kg/m2 38.37 kg/m2 36.49 kg/m2 38.31 kg/m2 36.49 kg/m2   BSA (m2)   2.31 m2 2.29 m2 2.29 m2 2.34 m2 2.29 m2   Visit Report   Report Report Report Report Report       Significant value      Performance:   ECOG " Performance Status: 1     Grade ECOG performance status   0 Fully active, able to carry on all pre-disease performance without restriction   1 Restricted in physically strenuous activity but ambulatory and able to carry out work of a light or sedentary nature, e.g., light housework, office work   2 Ambulatory and capable of all selfcare but unable to carry out any work activities; Up and about more than 50% of waking hours   3 Capable of only limited selfcare, confined to bed or chair more than 50% of waking hours   4 Completely disabled; Cannot carry out any selfcare; Totally confined to bed or chair   5 Dead     Physical Exam:  General: Patient is awake/alert/oriented x3, no distress   Psychological: Intact recent and remote memory, judgement, and insight. Appropriate mood, affect, and behavior        RESULTS/DATA     Labs:     Lab Results   Component Value Date    WBC 4.8 03/24/2025    NEUTROABS 2.96 03/24/2025    IGABSOL 0.01 03/24/2025    LYMPHSABS 1.06 (L) 03/24/2025    MONOSABS 0.58 03/24/2025    EOSABS 0.14 03/24/2025    BASOSABS 0.02 03/24/2025    RBC 4.09 (L) 03/24/2025    MCV 92 03/24/2025    MCHC 33.1 03/24/2025    HGB 12.5 (L) 03/24/2025    HCT 37.8 (L) 03/24/2025     03/24/2025       Lab Results   Component Value Date    CREATININE 0.98 03/24/2025    BUN 13 03/24/2025    EGFR 89 03/24/2025     03/24/2025    K 3.8 03/24/2025     03/24/2025    CO2 27 03/24/2025      Lab Results   Component Value Date    ALT 6 (L) 03/24/2025    AST 14 03/24/2025    ALKPHOS 41 03/24/2025    BILITOT 0.5 03/24/2025      Lab Results   Component Value Date    TSH 2.55 03/24/2025     Lab Results   Component Value Date    TSH 2.55 03/24/2025     Lab Results   Component Value Date    IRON 106 03/24/2025    TIBC 430 03/24/2025    FERRITIN 191 03/24/2025      Lab Results   Component Value Date    DLZBWGBB49 570 03/24/2025      Lab Results   Component Value Date    FOLATE >24.0 03/24/2025       Lab Results    Component Value Date    CRP 0.34 03/24/2025        Lab Results   Component Value Date    SPEP Normal. 03/24/2025       Lab Results   Component Value Date    WBC 4.8 03/24/2025    NEUTROABS 2.96 03/24/2025    IGABSOL 0.01 03/24/2025    LYMPHSABS 1.06 (L) 03/24/2025    MONOSABS 0.58 03/24/2025    EOSABS 0.14 03/24/2025    BASOSABS 0.02 03/24/2025    RBC 4.09 (L) 03/24/2025    MCV 92 03/24/2025    MCHC 33.1 03/24/2025    HGB 12.5 (L) 03/24/2025    HCT 37.8 (L) 03/24/2025     03/24/2025     Lab Results   Component Value Date    RETICCTPCT 1.7 03/24/2025      Lab Results   Component Value Date    CREATININE 0.98 03/24/2025    BUN 13 03/24/2025    EGFR 89 03/24/2025     03/24/2025    K 3.8 03/24/2025     03/24/2025    CO2 27 03/24/2025      Lab Results   Component Value Date    ALT 6 (L) 03/24/2025    AST 14 03/24/2025    ALKPHOS 41 03/24/2025    BILITOT 0.5 03/24/2025      Lab Results   Component Value Date    TSH 2.55 03/24/2025     Lab Results   Component Value Date    TSH 2.55 03/24/2025     Lab Results   Component Value Date    IRON 106 03/24/2025    TIBC 430 03/24/2025    FERRITIN 191 03/24/2025      Lab Results   Component Value Date    PGHIPXUY46 570 03/24/2025      Lab Results   Component Value Date    FOLATE >24.0 03/24/2025     Lab Results   Component Value Date    SEDRATE 11 03/24/2025      Lab Results   Component Value Date    CRP 0.34 03/24/2025        Lab Results   Component Value Date     03/24/2025     Lab Results   Component Value Date    HAPTOGLOBIN 175 03/24/2025     Lab Results   Component Value Date    SPEP Normal. 03/24/2025     Lab Results   Component Value Date     03/24/2025    IGM 46 03/24/2025     03/24/2025        Radiology/Studies:   US abdomen complete  4/4/2025  IMPRESSION:  1. Mild hepatomegaly with heterogenous echotexture and slight contour  irregularity could be related to underlying diffuse hepatocellular  dysfunction/steatosis without gross  lesions.      ASSESSMENT/PLAN     Assessment and Plan:   #1.  Iron deficiency anemia  59-year-old  male presents for evaluation of iron anemia.  Referred by his primary care physician, Dr. Ghulam Rabago.  Consistent anemia has been noted over the last 4 years dating back to May 2020.  Hemoglobin ranging from 7.6-13.2.  Chemistry shows normal liver and kidney function.  ALT slightly decreased several years.  History of diverticulitis and GI bleeds.  Patient has been taking oral iron for many years along with multivitamins.    Medical history includes hypertension, atherosclerotic heart disease, hypercholesteremia, A-fib, sinus inflammation, diabetes mellitus, class II obesity, bariatric surgery Haydee-en-Y 2018, diverticulosis, bowel resection, malabsorption, gastritis, acid reflux, gout, cervical radiculopathy, cardiac stent, active asthma, WM.    Workup:  Discussed workup with patient.  Reassess nutritional labs, inflammatory markers, SPEP, TSH, uric acid, kappa light chain, heavy chain, haptoglobin, reticulocytes, LDH.  Patient drinks evenings either 5-6 bourbon per week or 12 beers.  Also chews tobacco occasionally during stress.   Discussed likely malabsorption due to Haydee-en-Y.  If possible he would prefer to stay on oral iron.  He is willing to have IV iron if it is needed.  Patient states he will be seeing nephrology for uncontrolled blood pressure.    Discussion of Workup:  Workup primarily negative. Kappa lc slightly elevated, likely inflammatory. No M-protein. Continues oral iron with no improvement. HGB 12.5, tsat 25%, ferritin 191. Elects to have IV iron one time dose. Advised alcohol cessation. Fatty liver. Advised hepatology. Patient elects to wait till IV iron infusion. Hx of bariatric surgery. Liley malabsorption as well. Follow in 2 months.     **Please follow with specialties as scheduled for other health needs and routine screenings.**    Follow up:    RTC:  -2 months with  labs    Medications:  -IV Feraheme 510 mg x1 dose    Imaging/Testing:  -NA    Referral(s):  -N/A    Other Pertinent Appointments:  -Primary care 4/7/2025  -Nephrology 4/30/2025      Patient Discussion Summary:  Discussed plan of care in detail. Patient states understanding and in agreement to the plan. Answered all questions to there liking. The patient will call with any additional questions and/or concerns.    Thank you for allowing me to participate in your care. It was a pleasure meeting you.    Sincerely,  Fannie Adam, JOSE GUADALUPE-CNP    Hematology/Oncology Clinical Nurse Practitioner     [facility]   [id number]  [place] [address] [place] [address]  Office #: [phone]    DISCLAIMER:   In preparing for this visit and writing this note, I reviewed all the previous electronic medical records (testing, labs, imaging, and other procedures, provider notes, and medical charts) of the patient available in the physician portal pertinent to patient care. Significant findings which helped in decision making are recorded in this chart.    This document may have been written by voice recognition software.  There may be some incorrect wording, spelling and/or spelling errors or punctuation errors that were not corrected prior to committing the note to the medical record. Please request clarification if there is documentation error or clarification is needed.   Time based billing: Please see documentation within this specific encounter.

## 2025-04-14 ENCOUNTER — APPOINTMENT (OUTPATIENT)
Dept: RADIOLOGY | Facility: HOSPITAL | Age: 60
End: 2025-04-14
Payer: COMMERCIAL

## 2025-04-14 ENCOUNTER — HOSPITAL ENCOUNTER (EMERGENCY)
Facility: HOSPITAL | Age: 60
Discharge: HOME | End: 2025-04-14
Payer: COMMERCIAL

## 2025-04-14 VITALS
WEIGHT: 245 LBS | RESPIRATION RATE: 16 BRPM | HEART RATE: 62 BPM | OXYGEN SATURATION: 97 % | BODY MASS INDEX: 37.13 KG/M2 | HEIGHT: 68 IN | DIASTOLIC BLOOD PRESSURE: 74 MMHG | SYSTOLIC BLOOD PRESSURE: 128 MMHG | TEMPERATURE: 97.5 F

## 2025-04-14 DIAGNOSIS — M25.512 ACUTE PAIN OF LEFT SHOULDER: Primary | ICD-10-CM

## 2025-04-14 PROCEDURE — 99284 EMERGENCY DEPT VISIT MOD MDM: CPT

## 2025-04-14 PROCEDURE — 73030 X-RAY EXAM OF SHOULDER: CPT | Mod: LEFT SIDE | Performed by: RADIOLOGY

## 2025-04-14 PROCEDURE — 73030 X-RAY EXAM OF SHOULDER: CPT | Mod: LT

## 2025-04-14 PROCEDURE — 73050 X-RAY EXAM OF SHOULDERS: CPT | Mod: BILATERAL PROCEDURE | Performed by: RADIOLOGY

## 2025-04-14 PROCEDURE — 73050 X-RAY EXAM OF SHOULDERS: CPT

## 2025-04-14 RX ORDER — TRAMADOL HYDROCHLORIDE 50 MG/1
50 TABLET ORAL EVERY 6 HOURS PRN
Qty: 12 TABLET | Refills: 0 | Status: SHIPPED | OUTPATIENT
Start: 2025-04-14 | End: 2025-04-17

## 2025-04-14 ASSESSMENT — LIFESTYLE VARIABLES
EVER FELT BAD OR GUILTY ABOUT YOUR DRINKING: NO
HAVE PEOPLE ANNOYED YOU BY CRITICIZING YOUR DRINKING: NO
HAVE YOU EVER FELT YOU SHOULD CUT DOWN ON YOUR DRINKING: NO
EVER HAD A DRINK FIRST THING IN THE MORNING TO STEADY YOUR NERVES TO GET RID OF A HANGOVER: NO
TOTAL SCORE: 0

## 2025-04-14 ASSESSMENT — COLUMBIA-SUICIDE SEVERITY RATING SCALE - C-SSRS
1. IN THE PAST MONTH, HAVE YOU WISHED YOU WERE DEAD OR WISHED YOU COULD GO TO SLEEP AND NOT WAKE UP?: NO
2. HAVE YOU ACTUALLY HAD ANY THOUGHTS OF KILLING YOURSELF?: NO
6. HAVE YOU EVER DONE ANYTHING, STARTED TO DO ANYTHING, OR PREPARED TO DO ANYTHING TO END YOUR LIFE?: NO

## 2025-04-14 ASSESSMENT — PAIN DESCRIPTION - PAIN TYPE
TYPE: ACUTE PAIN
TYPE: ACUTE PAIN

## 2025-04-14 ASSESSMENT — PAIN DESCRIPTION - DESCRIPTORS
DESCRIPTORS: ACHING
DESCRIPTORS: SHARP

## 2025-04-14 ASSESSMENT — PAIN DESCRIPTION - FREQUENCY: FREQUENCY: CONSTANT/CONTINUOUS

## 2025-04-14 ASSESSMENT — PAIN DESCRIPTION - ORIENTATION
ORIENTATION: LEFT
ORIENTATION: LEFT

## 2025-04-14 ASSESSMENT — PAIN SCALES - GENERAL
PAINLEVEL_OUTOF10: 7
PAINLEVEL_OUTOF10: 7

## 2025-04-14 ASSESSMENT — PAIN - FUNCTIONAL ASSESSMENT
PAIN_FUNCTIONAL_ASSESSMENT: 0-10
PAIN_FUNCTIONAL_ASSESSMENT: 0-10

## 2025-04-14 ASSESSMENT — PAIN DESCRIPTION - ONSET: ONSET: PROGRESSIVE

## 2025-04-14 ASSESSMENT — PAIN DESCRIPTION - LOCATION
LOCATION: SHOULDER
LOCATION: ARM

## 2025-04-14 NOTE — ED PROVIDER NOTES
HPI   Chief Complaint   Patient presents with   • Arm Injury     Left arm after lifting a dresser into a truck on Sunday       60-year-old male presenting with left shoulder pain.  Patient states he was moving a dresser to put into his truck when he felt a pop in his left shoulder.  He has never injured that shoulder before.  He does have extensive carpal tunnel and trigger fingers on bilateral hands and has seen [facility] in the past.              Patient History   Past Medical History:   Diagnosis Date   • Coronary artery disease    • Diabetes mellitus (Multi)    • Hypertension    • Morbid (severe) obesity due to excess calories (Multi) 11/01/2016    Morbid obesity   • Other allergy status, other than to drugs and biological substances     Environmental allergies   • Other conditions influencing health status     Ulcer   • Palpitations     Heart palpitations   • Personal history of other diseases of the circulatory system 04/09/2021    History of malignant hypertension   • Personal history of other diseases of the circulatory system     History of atrial fibrillation   • Personal history of other diseases of the circulatory system     History of cardiac murmur   • Personal history of other diseases of the digestive system 04/07/2014    History of gastroesophageal reflux (GERD)   • Personal history of other diseases of the digestive system 04/07/2014    History of diverticulitis of colon   • Personal history of other diseases of the musculoskeletal system and connective tissue     History of gout   • Personal history of other diseases of the nervous system and sense organs 04/07/2014    History of sleep apnea   • Personal history of other diseases of the nervous system and sense organs     History of glaucoma   • Personal history of other diseases of the respiratory system     History of bronchitis   • Personal history of other endocrine, nutritional and metabolic disease     History of diabetes  mellitus   • Personal history of other endocrine, nutritional and metabolic disease     History of obesity   • Personal history of other medical treatment     History of stress test   • Personal history of other specified conditions     History of headache   • Personal history of other specified conditions     History of impaired glucose tolerance     Past Surgical History:   Procedure Laterality Date   • CARDIAC CATHETERIZATION N/A 10/4/2023    Procedure: Left Heart Cath;  Surgeon: Jase Kumar MD;  Location: [facility] Cardiac Cath Lab;  Service: Cardiovascular;  Laterality: N/A;   • CARDIAC CATHETERIZATION N/A 10/4/2023    Procedure: PCI;  Surgeon: Jaswinder Botello MD;  Location: [facility] Cardiac Cath Lab;  Service: Cardiovascular;  Laterality: N/A;   • COLECTOMY PARTIAL / TOTAL  04/02/2014    Partial Colectomy - Sigmoid   • COLONOSCOPY  02/09/2017    Colonoscopy   • HAND SURGERY  08/15/2016    Hand Surgery                                                                                                                                                         • STOMACH SURGERY  02/09/2017    Gastric Surgery     Family History   Problem Relation Name Age of Onset   • Leukemia Mother     • Coronary artery disease Father     • Other (Cardiac disorder) Father     • Diabetes Other     • Other (Cardiac disorder) Sibling     • Hypertension Sibling       Social History     Tobacco Use   • Smoking status: Never   • Smokeless tobacco: Never   Vaping Use   • Vaping status: Never Used   Substance Use Topics   • Alcohol use: Yes     Alcohol/week: 8.0 - 12.0 standard drinks of alcohol     Types: 8 - 12 Cans of beer per week   • Drug use: Never       Physical Exam   ED Triage Vitals [04/14/25 1212]   Temperature Heart Rate Respirations BP   36.6 °C (97.9 °F) 67 16 124/73      Pulse Ox Temp Source Heart Rate Source Patient Position   96 % Temporal Monitor Sitting      BP Location FiO2 (%)     Left arm --       Physical Exam  Vitals  reviewed.   Constitutional:       General: He is not in acute distress.  HENT:      Head: Normocephalic and atraumatic.      Right Ear: Tympanic membrane normal.      Left Ear: Tympanic membrane normal.      Nose: Nose normal.      Mouth/Throat:      Mouth: Mucous membranes are moist.      Pharynx: Oropharynx is clear.   Eyes:      Extraocular Movements: Extraocular movements intact.      Pupils: Pupils are equal, round, and reactive to light.   Cardiovascular:      Rate and Rhythm: Normal rate and regular rhythm.      Pulses: Normal pulses.      Heart sounds: Normal heart sounds. No murmur heard.     No friction rub. No gallop.   Pulmonary:      Effort: Pulmonary effort is normal. No respiratory distress.      Breath sounds: Normal breath sounds. No wheezing or rhonchi.   Chest:      Chest wall: No tenderness.   Abdominal:      General: There is no distension.      Palpations: Abdomen is soft.      Tenderness: There is no abdominal tenderness.   Musculoskeletal:         General: Tenderness present. Normal range of motion.      Cervical back: Normal range of motion. No tenderness.      Comments: Range of motion normal with the exception of the left shoulder.  Limited anterior raise, positive Neer.  Other maneuvers limited secondary to pain.  The limb is neurovascularly intact.  5 out of 5  strength but also elicits pain.   Skin:     General: Skin is warm and dry.      Capillary Refill: Capillary refill takes less than 2 seconds.   Neurological:      General: No focal deficit present.      Mental Status: He is alert and oriented to person, place, and time.      Sensory: No sensory deficit.      Motor: No weakness.   Psychiatric:         Mood and Affect: Mood normal.         Behavior: Behavior normal.           ED Course & MDM   Diagnoses as of 04/14/25 1352   Acute pain of left shoulder                 No data recorded                                 Medical Decision Making  Well-appearing 60-year-old here with  left shoulder pain.  X-rays obtained including weighted AC view.  This time we will see the patient in a sling for comfort and he has to follow-up with [facility].  He was called in tramadol for pain control.        Procedure  Procedures     Evaristo Francisco PA-C  04/14/25 0525     None

## 2025-04-14 NOTE — DISCHARGE INSTRUCTIONS
Referral sent to Orthopedics. If you do not receive call within 48h, please call [facility] orthopedics to schedule.

## 2025-04-14 NOTE — ED TRIAGE NOTES
Pt arrived through triage with c/o left arm pain. Pt states that he was lifting a dresser up into a truck yesterday afternoon when he felt a pop. He has 7/10 pain with movement. He is able to extend his arm up in front of him nursing home. When moving his arm out to his side it causes him more pain. Pt has not taken any medications for the pain today.

## 2025-04-15 DIAGNOSIS — K21.9 GASTROESOPHAGEAL REFLUX DISEASE, UNSPECIFIED WHETHER ESOPHAGITIS PRESENT: ICD-10-CM

## 2025-04-15 RX ORDER — PANTOPRAZOLE SODIUM 40 MG/1
40 TABLET, DELAYED RELEASE ORAL DAILY
Qty: 90 TABLET | Refills: 1 | Status: SHIPPED | OUTPATIENT
Start: 2025-04-15

## 2025-04-21 ENCOUNTER — INFUSION (OUTPATIENT)
Dept: HEMATOLOGY/ONCOLOGY | Facility: CLINIC | Age: 60
End: 2025-04-21
Payer: COMMERCIAL

## 2025-04-21 VITALS
DIASTOLIC BLOOD PRESSURE: 76 MMHG | SYSTOLIC BLOOD PRESSURE: 129 MMHG | BODY MASS INDEX: 38.06 KG/M2 | RESPIRATION RATE: 17 BRPM | OXYGEN SATURATION: 95 % | WEIGHT: 250.33 LBS | TEMPERATURE: 96.6 F | HEART RATE: 78 BPM

## 2025-04-21 DIAGNOSIS — K90.49 MALABSORPTION DUE TO INTOLERANCE, NOT ELSEWHERE CLASSIFIED: ICD-10-CM

## 2025-04-21 DIAGNOSIS — D50.8 IRON DEFICIENCY ANEMIA SECONDARY TO INADEQUATE DIETARY IRON INTAKE: ICD-10-CM

## 2025-04-21 PROCEDURE — 2500000004 HC RX 250 GENERAL PHARMACY W/ HCPCS (ALT 636 FOR OP/ED): Mod: JZ

## 2025-04-21 PROCEDURE — 96365 THER/PROPH/DIAG IV INF INIT: CPT | Mod: INF

## 2025-04-21 RX ORDER — DIPHENHYDRAMINE HYDROCHLORIDE 50 MG/ML
50 INJECTION, SOLUTION INTRAMUSCULAR; INTRAVENOUS AS NEEDED
OUTPATIENT
Start: 2025-04-28

## 2025-04-21 RX ORDER — HEPARIN SODIUM,PORCINE/PF 10 UNIT/ML
50 SYRINGE (ML) INTRAVENOUS AS NEEDED
OUTPATIENT
Start: 2025-04-21

## 2025-04-21 RX ORDER — EPINEPHRINE 0.3 MG/.3ML
0.3 INJECTION SUBCUTANEOUS EVERY 5 MIN PRN
Status: DISCONTINUED | OUTPATIENT
Start: 2025-04-21 | End: 2025-04-21 | Stop reason: HOSPADM

## 2025-04-21 RX ORDER — ALBUTEROL SULFATE 0.83 MG/ML
3 SOLUTION RESPIRATORY (INHALATION) AS NEEDED
OUTPATIENT
Start: 2025-04-28

## 2025-04-21 RX ORDER — FAMOTIDINE 10 MG/ML
20 INJECTION, SOLUTION INTRAVENOUS ONCE AS NEEDED
Status: DISCONTINUED | OUTPATIENT
Start: 2025-04-21 | End: 2025-04-21 | Stop reason: HOSPADM

## 2025-04-21 RX ORDER — HEPARIN 100 UNIT/ML
500 SYRINGE INTRAVENOUS AS NEEDED
OUTPATIENT
Start: 2025-04-21

## 2025-04-21 RX ORDER — FAMOTIDINE 10 MG/ML
20 INJECTION, SOLUTION INTRAVENOUS ONCE AS NEEDED
OUTPATIENT
Start: 2025-04-28

## 2025-04-21 RX ORDER — ALBUTEROL SULFATE 0.83 MG/ML
3 SOLUTION RESPIRATORY (INHALATION) AS NEEDED
Status: DISCONTINUED | OUTPATIENT
Start: 2025-04-21 | End: 2025-04-21 | Stop reason: HOSPADM

## 2025-04-21 RX ORDER — EPINEPHRINE 0.3 MG/.3ML
0.3 INJECTION SUBCUTANEOUS EVERY 5 MIN PRN
OUTPATIENT
Start: 2025-04-28

## 2025-04-21 RX ORDER — DIPHENHYDRAMINE HYDROCHLORIDE 50 MG/ML
50 INJECTION, SOLUTION INTRAMUSCULAR; INTRAVENOUS AS NEEDED
Status: DISCONTINUED | OUTPATIENT
Start: 2025-04-21 | End: 2025-04-21 | Stop reason: HOSPADM

## 2025-04-21 RX ADMIN — SODIUM CHLORIDE 510 MG: 900 INJECTION, SOLUTION INTRAVENOUS at 14:28

## 2025-04-21 ASSESSMENT — PAIN SCALES - GENERAL: PAINLEVEL_OUTOF10: 2

## 2025-04-23 ENCOUNTER — APPOINTMENT (OUTPATIENT)
Dept: PRIMARY CARE | Facility: CLINIC | Age: 60
End: 2025-04-23
Payer: COMMERCIAL

## 2025-04-23 ENCOUNTER — OFFICE VISIT (OUTPATIENT)
Dept: ORTHOPEDIC SURGERY | Facility: CLINIC | Age: 60
End: 2025-04-23
Payer: COMMERCIAL

## 2025-04-23 VITALS — WEIGHT: 245 LBS | HEIGHT: 68 IN | BODY MASS INDEX: 37.13 KG/M2

## 2025-04-23 VITALS
BODY MASS INDEX: 37.86 KG/M2 | HEART RATE: 76 BPM | DIASTOLIC BLOOD PRESSURE: 82 MMHG | OXYGEN SATURATION: 95 % | WEIGHT: 249 LBS | SYSTOLIC BLOOD PRESSURE: 126 MMHG

## 2025-04-23 DIAGNOSIS — I10 BENIGN ESSENTIAL HYPERTENSION: Primary | ICD-10-CM

## 2025-04-23 DIAGNOSIS — S43.432A LABRAL TEAR OF SHOULDER, LEFT, INITIAL ENCOUNTER: ICD-10-CM

## 2025-04-23 DIAGNOSIS — E11.9 CONTROLLED TYPE 2 DIABETES MELLITUS WITHOUT COMPLICATION, WITHOUT LONG-TERM CURRENT USE OF INSULIN: ICD-10-CM

## 2025-04-23 DIAGNOSIS — M75.122 COMPLETE TEAR OF LEFT ROTATOR CUFF, UNSPECIFIED WHETHER TRAUMATIC: Primary | ICD-10-CM

## 2025-04-23 DIAGNOSIS — M75.42 IMPINGEMENT SYNDROME OF LEFT SHOULDER: ICD-10-CM

## 2025-04-23 DIAGNOSIS — M75.22 BICEPS TENDINITIS OF LEFT SHOULDER: ICD-10-CM

## 2025-04-23 DIAGNOSIS — M75.112 INCOMPLETE TEAR OF LEFT ROTATOR CUFF, UNSPECIFIED WHETHER TRAUMATIC: ICD-10-CM

## 2025-04-23 DIAGNOSIS — M25.512 LEFT SHOULDER PAIN, UNSPECIFIED CHRONICITY: ICD-10-CM

## 2025-04-23 DIAGNOSIS — E78.00 HYPERCHOLESTEROLEMIA: ICD-10-CM

## 2025-04-23 PROCEDURE — 1036F TOBACCO NON-USER: CPT | Performed by: ORTHOPAEDIC SURGERY

## 2025-04-23 PROCEDURE — 3079F DIAST BP 80-89 MM HG: CPT | Performed by: INTERNAL MEDICINE

## 2025-04-23 PROCEDURE — 99214 OFFICE O/P EST MOD 30 MIN: CPT | Performed by: INTERNAL MEDICINE

## 2025-04-23 PROCEDURE — 4010F ACE/ARB THERAPY RXD/TAKEN: CPT | Performed by: INTERNAL MEDICINE

## 2025-04-23 PROCEDURE — 3074F SYST BP LT 130 MM HG: CPT | Performed by: INTERNAL MEDICINE

## 2025-04-23 PROCEDURE — 3008F BODY MASS INDEX DOCD: CPT | Performed by: ORTHOPAEDIC SURGERY

## 2025-04-23 PROCEDURE — 99204 OFFICE O/P NEW MOD 45 MIN: CPT | Performed by: ORTHOPAEDIC SURGERY

## 2025-04-23 PROCEDURE — 4010F ACE/ARB THERAPY RXD/TAKEN: CPT | Performed by: ORTHOPAEDIC SURGERY

## 2025-04-23 PROCEDURE — 1036F TOBACCO NON-USER: CPT | Performed by: INTERNAL MEDICINE

## 2025-04-23 PROCEDURE — 20610 DRAIN/INJ JOINT/BURSA W/O US: CPT | Performed by: ORTHOPAEDIC SURGERY

## 2025-04-23 RX ORDER — TIRZEPATIDE 5 MG/.5ML
5 INJECTION, SOLUTION SUBCUTANEOUS WEEKLY
Qty: 2 ML | Refills: 0 | Status: SHIPPED | OUTPATIENT
Start: 2025-04-23 | End: 2025-04-24 | Stop reason: SDUPTHER

## 2025-04-23 RX ORDER — LIDOCAINE HYDROCHLORIDE 5 MG/ML
4 INJECTION, SOLUTION INFILTRATION; PERINEURAL
Status: COMPLETED | OUTPATIENT
Start: 2025-04-23 | End: 2025-04-23

## 2025-04-23 RX ORDER — DICLOFENAC SODIUM 10 MG/G
4 GEL TOPICAL 2 TIMES DAILY PRN
Qty: 450 G | Refills: 0 | Status: SHIPPED | OUTPATIENT
Start: 2025-04-23

## 2025-04-23 RX ORDER — TRIAMCINOLONE ACETONIDE 40 MG/ML
40 INJECTION, SUSPENSION INTRA-ARTICULAR; INTRAMUSCULAR
Status: COMPLETED | OUTPATIENT
Start: 2025-04-23 | End: 2025-04-23

## 2025-04-23 RX ADMIN — LIDOCAINE HYDROCHLORIDE 4 ML: 5 INJECTION, SOLUTION INFILTRATION; PERINEURAL at 16:52

## 2025-04-23 RX ADMIN — TRIAMCINOLONE ACETONIDE 40 MG: 40 INJECTION, SUSPENSION INTRA-ARTICULAR; INTRAMUSCULAR at 16:52

## 2025-04-23 ASSESSMENT — PAIN - FUNCTIONAL ASSESSMENT: PAIN_FUNCTIONAL_ASSESSMENT: 0-10

## 2025-04-23 ASSESSMENT — PAIN SCALES - GENERAL
PAINLEVEL_OUTOF10: 7
PAINLEVEL_OUTOF10: 0-NO PAIN

## 2025-04-23 NOTE — PROGRESS NOTES
60-year-old male status post injury to his left shoulder.  Patient stated on April 13 he was picking up a dresser and heard and felt a pop in the shoulder.  He did not develop any bruising.  He has gotten mildly better but still has significant pain and weakness in the left shoulder    Patients' self reported past medical history, medications, allergies, surgical history, family and social history as well as a 10 point review of systems has been documented in the new patient intake form and scanned into the patient's electronic medical record.  The intake form was reviewed by Dr Yeh during the office visit and signed by Dr. Yeh and the patient.  Pertinent findings are documented in the HPI.    General Multi-System Physical Exam:  Constitutional  General appearance:  Alert, oriented, and in no acute distress.  Well developed, well nourished.  Head and Face  Head and face:  Normocephalic and atraumatic.  Ears, Nose, Mouth, and Throat  External inspection of ears and nose: Normal.  Eyes:  Pupils are equal and round.  Neck  Neck:  no neck mass was observed.  Pulmonary  Respiratory effort:  no respiratory distress.  Cardiovascular  Intact distal pulses.  Lymphatic  Palpation of lymph nodes in the affected extremity:  Normal.  Skin  Skin and subcutaneous tissue:  Normal skin color and pigmentation.  Normal skin turgor.  No rashes.  Neurologic  Sensation:  normal to light touch.  Psychiatric  Judgement and insight:  Intact.  Mood and affect:  Normal.  Musculoskeletal  Left shoulder is 120 degrees of active abduction forward flexion 140 degrees of passive abduction forward flexion 60 degrees of external rotation internal rotates to sacrum he has significant pain with tenderness bicipital groove positive Neer positive Rivas positive Jobes with pain and weakness neurovasc intact left upper extremity    X-rays of the patient were ordered by Dr Yeh and obtained today.  Dr Yeh personally reviewed the results of the  "x-rays.    In addition, Dr Yeh independently interpreted the patient's x-rays (performed by the Radiology department) by viewing the x-ray images and this is Dr. Yeh's personal interpretation:     Normal x-rays left shoulder    I explained the patient that he most likely has a traumatic rotator cuff tear.  We talked about the fact patients do better with early surgery however he wants to start with conservative treatment.    We had a long discussion in regards to the patient's shoulder pain.  The differential diagnosis of the patient's shoulder pain include: shoulder impingement, rotator cuff tendinopathy, rotator cuff tearing, and biceps tendinopathy as all being potential sources of the pain.  There are numerous non-operative and operative treatment options for each of these conditions.     We will start off by treating the patient's shoulder conservatively (AKA non-operatively).  We gave the patient a prescription for physical therapy to work on increasing the range of motion and strength in the shoulder.   We also gave the patient a \"home exercise protocol\" list of exercises that they could work on to strengthen their shoulder at home.  We also called in a prescription for anti-inflammatories for the patient.  The patient was informed that there are rare risks of using nonsteroidal antiinflammatory (NSAID) medications.  Risks of NSAIDS include, but are not limited to, upset stomach, ulcers in the stomach and other places in the gastrointestinal tract, and a mild increase in cardiovascular risk as a result of the antiinflammatory medications.  In addition, there is an increased risk in bleeding as a result of the medications.  The patient was advised to stop taking the NSAIDs if they cause them to have an upset stomach.  The patient was instructed to take the medication on a p.r.n. basis as needed only.  NSAIDs are not supposed to be taken every day for more than a few weeks.  If they have any questions or " "problems with the antiinflammatory medications, they should stop taking the medication immediately and call the office.      The patient's height and weight were documented today in the vitals tab in the patient's EPIC chart, and the patient's BMI was calculated.  A follow up plan was then developed by Dr. Yeh, per  mandated guidelines, based upon the patient's BMI and the follow up plan was documented in the \"Patient Instructions\" section of the chart.  Weight loss can be achieved by decreasing the amount of calories consumed daily and by increasing daily physical activity.  We recommend finding physical activities that you can participate in regularly and you enjoy which do not worsen your current joint pains.       We offered the patient an injection of steroids into their shoulder.  I explained to the patient that there are some rare risks of steroid injections.      Rare risks of steroid injections into the shoulder include pain at the site of the injection, local swelling, irritation from the injection or the skin spray, local discoloration of the skin, risk of bleeding, and a risk of shoulder infection.  If the patient does have a flareup of pain in the evening following the injection, they should ice the shoulder 15 minutes at a time 3 times a day for up to 3 days.  If the pain gets too severe, they should go to a local Emergency Room right away.      The patient decided to proceed with the injection.  After sterilely prepping the posterior aspect of the shoulder joint with Betadyne, 5 mL of 0.25% Marcaine and 1 mL of Kenalog 40 mg were injected into the subacromial bursa from a posterior approach.  There were no complications.  A bandage was applied over the site of the injection. The patient tolerated the procedure well.    We will see the patient back in 6-8 weeks to reevaluate their shoulder pain. If they are still having pain at that time, we would then need to order a MRI to look for possible " "rotator cuff tears or biceps tearing in the shoulder.  The patient should call the office during business hours (9am-3pm; Monday - Friday)  with any questions or problems.  If the patient has any urgent issues outside of business hours, they should go to a local Emergency Room.    We gave him prescription for Voltaren gel      The patient's height and weight were documented today in the vitals tab in the patient's EPIC chart, and the patient's BMI was calculated.  A follow up plan was then developed by Dr. Yeh, per  mandated guidelines, based upon the patient's BMI and the follow up plan was documented in the \"Patient Instructions\" section of the chart.  Weight loss can be achieved by decreasing the amount of calories consumed daily and by increasing daily physical activity.  We recommend finding physical activities that you can participate in regularly and you enjoy which do not worsen your current joint pains.       This patient has a new, acute, previously-undiagnosed problem of their affected extremity.  We will begin treatment as listed here and monitor treatment based upon their progression and response to treatment.  Due to the fact that we are just beginning treatment on this issue, this is currently considered an undiagnosed new problem with uncertain prognosis.  The exact diagnosis and their prognosis will depend upon their response to treatment and progression of their condition as time progresses.    Due to this patient's condition, they are at a moderate risk of morbidity from additional diagnostic testing / treatment.      To help them with their pain, I wrote them a prescription for prescription strength anti-inflammatories.  The patient was informed that there are risks of using nonsteroidal antiinflammatory (NSAID) medications.    Risks of NSAIDS include, but are not limited to, upset stomach, ulcers in the stomach and other places in the gastrointestinal tract, and a mild increase in " "cardiovascular risk as a result of the antiinflammatory medications.  In addition, there is an increased risk in bleeding as a result of the medications.    The patient was advised to stop taking the NSAIDs if they cause them to have an upset stomach.  NSAIDs are not supposed to be taken every day for more than a few weeks.  If they have any questions or problems with the antiinflammatory medications, they should stop taking the medication immediately and call the office.      Patient ID: Meir Richter \"Hayes" is a 60 y.o. male.    L Inj/Asp: L subacromial bursa on 4/23/2025 4:52 PM  Indications: pain  Details: 22 G needle, posterior approach  Medications: 40 mg triamcinolone acetonide 40 mg/mL; 4 mL lidocaine 5 mg/mL (0.5 %)  Outcome: tolerated well, no immediate complications  Procedure, treatment alternatives, risks and benefits explained, specific risks discussed. Consent was given by the patient. Immediately prior to procedure a time out was called to verify the correct patient, procedure, equipment, support staff and site/side marked as required. Patient was prepped and draped in the usual sterile fashion.         "

## 2025-04-23 NOTE — PATIENT INSTRUCTIONS
Change to Mounjaro , message me in 2 weeks and I will increase dose to 7.5mg weekly    Fasting labs one week prior to your next appt in 6 months

## 2025-04-23 NOTE — PROGRESS NOTES
Subjective   Patient ID: Tc Richter is a 60 y.o. male who presents for Follow-up.    HPI     Was started on hydralazine by cardiology. BP 130s/70s  Had IV iron yesterday      Review of Systems    Objective   /82   Pulse 76   Wt 113 kg (249 lb)   SpO2 95%   BMI 37.86 kg/m²     Physical Exam    Assessment/Plan        #CAD s/p PCI 2/2022 and PCTA 10/2023  -Follows with Dr. Christopher, no current cardiac sx. Cont DAPT and lovastatin 10mg daily, fenofibrate 145mg daily, Imdur 30mg daily      #DM2  -A1c 6.7 %  -Cont metformin 500mg BID   -Stop Ozempic and start Mounjaro 5mg weekly --will titrate up as tolerated      #Resistant HTN  -Improved. Encouraged to take daily BP at home.  -Cont amlodipine 5mg BID,  HCTZ 25mg daily, and benazepril 20mg BID, hydralazine 50mg TID and nebivolol 5mg daily   -he has upcoming appt with Dr. Naik      #WM/CSA  -f/u with Dr. Bellamy     #GINA  -Following with heme. Had Iron infusion yesterday         Influenza: Will get  COVID: x3  Prevnar 13: UTD   Pneumovax 23: UTD   Shingrix: Never -recommended   Colorectal ca screening: 10/12/22 - 10 years   PSA: 10/3/24     RTC 6 mo

## 2025-04-23 NOTE — PATIENT INSTRUCTIONS
BMI was above normal measurement. Current weight: 113 kg (249 lb)  Weight change since last visit (-) denotes wt loss 4 lbs   Weight loss needed to achieve BMI 25: 80.9 Lbs  Weight loss needed to achieve BMI 30: 48.1 Lbs  Advised to Increase physical activity.

## 2025-04-24 DIAGNOSIS — E11.9 CONTROLLED TYPE 2 DIABETES MELLITUS WITHOUT COMPLICATION, WITHOUT LONG-TERM CURRENT USE OF INSULIN: ICD-10-CM

## 2025-04-24 RX ORDER — TIRZEPATIDE 5 MG/.5ML
5 INJECTION, SOLUTION SUBCUTANEOUS WEEKLY
Qty: 2 ML | Refills: 0 | Status: SHIPPED | OUTPATIENT
Start: 2025-04-24

## 2025-04-30 ENCOUNTER — APPOINTMENT (OUTPATIENT)
Dept: NEPHROLOGY | Facility: CLINIC | Age: 60
End: 2025-04-30
Payer: COMMERCIAL

## 2025-04-30 VITALS
OXYGEN SATURATION: 97 % | WEIGHT: 245 LBS | DIASTOLIC BLOOD PRESSURE: 67 MMHG | HEART RATE: 60 BPM | TEMPERATURE: 97.3 F | HEIGHT: 68 IN | BODY MASS INDEX: 37.13 KG/M2 | SYSTOLIC BLOOD PRESSURE: 103 MMHG

## 2025-04-30 DIAGNOSIS — Z98.84 BARIATRIC SURGERY STATUS: ICD-10-CM

## 2025-04-30 DIAGNOSIS — I1A.0 RESISTANT HYPERTENSION: ICD-10-CM

## 2025-04-30 DIAGNOSIS — E78.1 HYPERTRIGLYCERIDEMIA: ICD-10-CM

## 2025-04-30 DIAGNOSIS — E66.01 CLASS 2 SEVERE OBESITY DUE TO EXCESS CALORIES WITH SERIOUS COMORBIDITY AND BODY MASS INDEX (BMI) OF 38.0 TO 38.9 IN ADULT: ICD-10-CM

## 2025-04-30 DIAGNOSIS — I25.119 ATHEROSCLEROSIS OF NATIVE CORONARY ARTERY OF NATIVE HEART WITH ANGINA PECTORIS: ICD-10-CM

## 2025-04-30 DIAGNOSIS — G47.31 SLEEP APNEA, PRIMARY CENTRAL: ICD-10-CM

## 2025-04-30 DIAGNOSIS — I10 BENIGN ESSENTIAL HYPERTENSION: Primary | ICD-10-CM

## 2025-04-30 DIAGNOSIS — G47.33 OBSTRUCTIVE SLEEP APNEA: ICD-10-CM

## 2025-04-30 DIAGNOSIS — E11.9 CONTROLLED TYPE 2 DIABETES MELLITUS WITHOUT COMPLICATION, WITHOUT LONG-TERM CURRENT USE OF INSULIN: ICD-10-CM

## 2025-04-30 DIAGNOSIS — E66.812 CLASS 2 SEVERE OBESITY DUE TO EXCESS CALORIES WITH SERIOUS COMORBIDITY AND BODY MASS INDEX (BMI) OF 38.0 TO 38.9 IN ADULT: ICD-10-CM

## 2025-04-30 LAB
POC APPEARANCE, URINE: ABNORMAL
POC BILIRUBIN, URINE: NEGATIVE
POC BLOOD, URINE: NEGATIVE
POC COLOR, URINE: YELLOW
POC GLUCOSE, URINE: NEGATIVE MG/DL
POC KETONES, URINE: NEGATIVE MG/DL
POC LEUKOCYTES, URINE: ABNORMAL
POC NITRITE,URINE: NEGATIVE
POC PH, URINE: 7 PH
POC PROTEIN, URINE: NEGATIVE MG/DL
POC SPECIFIC GRAVITY, URINE: 1.01
POC UROBILINOGEN, URINE: 0.2 EU/DL

## 2025-04-30 PROCEDURE — 81003 URINALYSIS AUTO W/O SCOPE: CPT | Performed by: INTERNAL MEDICINE

## 2025-04-30 PROCEDURE — 99204 OFFICE O/P NEW MOD 45 MIN: CPT | Performed by: INTERNAL MEDICINE

## 2025-04-30 PROCEDURE — 3074F SYST BP LT 130 MM HG: CPT | Performed by: INTERNAL MEDICINE

## 2025-04-30 PROCEDURE — 4010F ACE/ARB THERAPY RXD/TAKEN: CPT | Performed by: INTERNAL MEDICINE

## 2025-04-30 PROCEDURE — 3008F BODY MASS INDEX DOCD: CPT | Performed by: INTERNAL MEDICINE

## 2025-04-30 PROCEDURE — 3078F DIAST BP <80 MM HG: CPT | Performed by: INTERNAL MEDICINE

## 2025-04-30 NOTE — PATIENT INSTRUCTIONS
Dear Meir-it was nice meeting you nephrology clinic today discuss the following    # Resistant hypertension-currently under control  - Most likely metabolic syndrome  - I do not see any element of kidney involvement since your kidney function is normal and no protein leak in the urine  - Continue medication as it is  - Continue to follow with cardiology for blood pressure management  - Limit salt intake, daily allowance of sodium should not exceed 2000 mg, continue to address obstructive sleep apnea, continue to work on weight loss and healthier lifestyle  - Follow-up with nephrology as needed    Esau Naik MD, MS, NADIA CASEY   Clinical  - [facility] [employer]   Nephrologist - [facility] - [facility]

## 2025-04-30 NOTE — PROGRESS NOTES
"Subjective       Meir Richter \"Tc\" is a 60 y.o. male who has past medical history of significant obesity status post gastric bypass, well-controlled type 2 diabetes, arthritis, hyperlipidemia, iron deficiency anemia status post IV iron infusion, coronary artery disease status post PCI, remote gout was coming to see me today initial consultation for resistant hypertension per PCP Dr. Ghulam Rabago, DO      Patient came today with his girlfriend Marley.  He feels in his baseline state of health.  He reports having high blood pressure since age of 21.  No history of stroke.  He had recent PCI/angioplasty 2 years ago.  No kidney related complaints or concerns.  No lower urinary tract symptoms.  No leg swelling or shortness of breath.  He reports history of obstructive sleep apnea-stopped using the mask as he cannot tolerate it, working to reestablish care with sleep medicine team.  He reports watching salt in diet as possible-reinforced today.  He reports using topical NSAID for shoulder pain but no systemic NSAIDs.  He is trying to lose weight.  Currently on Mounjaro switched from Ozempic.  He is adherent to medications.  He follows with cardiology who recently added hydralazine 50 mg 3 times daily-since then blood pressure at home is consistently 120/80.  No dizziness or lightheadedness    Family history: Strong family history, sisters dad mom with hypertension  Social history: Lives with girlfriend, non-smoker, no drinking      Objective   /67 (BP Location: Right arm, Patient Position: Standing, BP Cuff Size: Large adult)   Pulse 60   Temp 36.3 °C (97.3 °F)   Ht 1.727 m (5' 8\")   Wt 111 kg (245 lb)   SpO2 97%   BMI 37.25 kg/m²   Wt Readings from Last 3 Encounters:   04/30/25 111 kg (245 lb)   04/23/25 113 kg (249 lb)   04/23/25 111 kg (245 lb)       Physical Exam    General appearance: no distress awake and alert on room air, obese, no significant hypervolemia exam  Eyes: non-icteric  HEENT: " "atrumatic head, PEERLA, moist mucosa  Skin: no apparent rash  Heart: NSR, S1, S2 normal, no murmur or gallop  Lungs: Symmetrical expansion,CTA bilat no wheezing/crackles  Abdomen: soft, nt/nd, obese  Extremities: no edema bilat  Neuro: No FND,asterixis, no focal deficits noticed        Review of Systems     Constitutional: no fever, no chills, no recent weight gain and no recent weight loss.   Eyes: no blurred vision and no diplopia.   ENT: no hearing loss, no earache, no sore throat, no swollen glands in the neck and no nasal discharge.   Cardiovascular: no chest pain, no palpitations and no lower extremity edema.   Respiratory: no shortness of breath, no chronic cough and no shortness of breath during exertion.   Gastrointestinal: no abdominal pain, no constipation, no heartburn, no vomiting, no bloody stools and no change in bowel movements.   Genitourinary: no dysuria and no hematuria.   Musculoskeletal: Arthralgia  Skin: no rashes and no skin lesions.   Neurological: no headaches and no dizziness.   Psychiatric: no confusion, no depression and no anxiety.   Endocrine: no heat intolerance, no cold intolerance, appetite not increased, no thyroid disorder, no increased urinary frequency and no dry skin.   Hematologic/Lymphatic: does not bleed easily and does not bruise easily.   All other systems have been reviewed and are negative for complaint.         Data Review                   Lab Results   Component Value Date    URICACID 4.9 03/24/2025           Lab Results   Component Value Date    HGBA1C 6.7 (H) 10/03/2024                 No lab exists for component: \"CR\", \"PHOSPHORUS\"        Albumin/Creatinine Ratio   Date Value Ref Range Status   10/03/2024 6.5 <30.0 ug/mg Creat Final     Albumin/Creatine Ratio   Date Value Ref Range Status   04/21/2022 10.5 0.0 - 30.0 ug/mg crt Final            RFP  Recent Labs     03/24/25  1137 01/17/25  0905 11/07/24  2200 10/03/24  0902 10/23/23  0844 10/26/22  1100 " "04/21/22  1036    141 133* 141 142 141 139   K 3.8 3.8 3.3* 4.6 4.6 4.3 4.0    104 97* 102 103 106 102   CO2 27 27 24 29 30 29 29   BUN 13 16 14 16 17 15 17   CREATININE 0.98 0.90 0.94 0.95 0.87 0.88 0.78   GLUCOSE 85 140* 152* 115* 131* 105* 176*   CALCIUM 10.0 9.9 9.4 10.1 9.8 9.8 9.6   EGFR 89 >90 >90 >90 >90  --   --    ANIONGAP 13 14 15 15 14 10 12        Urineanalysis  Recent Labs     04/30/25  0932 03/29/23  1259 01/09/21  1706   COLORU Yellow Yellow COLORLESS   APPEARANCEU Hazy* Clear CLEAR   SPECGRAVU 1.015 1.020 1.003*   CEDRIC 7.0 7.0 6.0   PROTUR NEGATIVE NEGATIVE NEGATIVE   GLUCOSEU NEGATIVE NEGATIVE NEGATIVE   BLOODU NEGATIVE NEGATIVE SMALL(1+)*   KETONESU NEGATIVE NEGATIVE 5(Trace)*   BILIRUBINU NEGATIVE NEGATIVE NEGATIVE   NITRITEU NEGATIVE NEGATIVE NEGATIVE   LEUKOCYTESU  --   --  NEGATIVE       Urine Electrolytes  Recent Labs     04/30/25  0932 10/03/24  0935 03/29/23  1259 04/21/22  1030 01/09/21  1706   CREATU  --  123.9  --  150.0  --    PROTUR NEGATIVE  --  NEGATIVE  --  NEGATIVE   ALBUMINUR  --  8.0  --   --   --    MICROALBCREA  --  6.5  --  10.5  --         Urine Micro  Recent Labs     01/09/21  1706   WBCU NONE   RBCU 1        Iron  Recent Labs     03/24/25  1137 01/17/25  0905 10/03/24  0902 10/26/22  1100 12/28/21  1146 07/29/21  0934 06/26/18  0944   IRON 106 89 64 129 158* 101 87   TIBC 430 446* 462* 439 388 378 347   IRONSAT 25 20* 14* 29 41 27 25   FERRITIN 191 216 172 140 156 97 390*          Medications Ordered Prior to Encounter[1]        Assessment and Plan       Meir RIOS Richter \"Tc\"  is a 60 y.o. male  who has past medical history of significant obesity status post gastric bypass, well-controlled type 2 diabetes, arthritis, hyperlipidemia, iron deficiency anemia status post IV iron infusion, coronary artery disease status post PCI, remote gout was coming to see me today initial consultation for resistant hypertension per PCP Dr. Ghulam Rabago, DO    # Resistant " hypertension-currently controlled  - Current medication amlodipine 5 mg twice daily, hydralazine 50 mg 3 times daily, hydrochlorothiazide 25 mg daily, isosorbide mononitrate 30 mg daily, Nebivolol-under care of cardiology  - I see no red flags or concerns from kidney standpoint.  Serum creatinine is normal.  TSH is normal.  Urine dipstick today is bland with no albuminuria.  Most likely hypertension is related to metabolic syndrome/obesity/untreated WM.  Less likely kidney related.  I will defer further workup from the kidney standpoint  - Ideally we should introduce some RAAS inhibitors to his regimen however I do not see immediate indication  - Continue low-salt diet, weight loss, WM treatment  - Within normal electrolytes, no acid-base disorder      # CVS/coronary artery disease-high risk  - Currently on fenofibrate  - Currently on aspirin/Brilinta  - Currently on GLP RA-might consider SGL inhibitor/RAAS inhibitors    #Diabetes-well-controlled  - Currently on metformin/GLP RA      #Others  - No NSAIDs, no contrast as possible. If to be done- we recommend holding ACEi/ARBS/diuretics 24 hrs prior to contrast exposure and ensure appropriate hydration   - Ensure well hydration  - Limit salt in diet  - No smoking    Patient received CKD education and counselling  Follow-up with nephrology as needed    Esau Naik MD, MS, NADIA CASEY   Clinical  - [facility] [employer]   Nephrologist - [facility]                   [1]   Current Outpatient Medications on File Prior to Visit   Medication Sig Dispense Refill    albuterol 90 mcg/actuation inhaler Inhale 1-2 puffs if needed for wheezing. Every 4 to 6 hours 54 g 1    amLODIPine (Norvasc) 5 mg tablet TAKE 1 TABLET TWICE A  tablet 3    ascorbic acid, vitamin C, 500 mg capsule Take 1 capsule by mouth once daily.      aspirin 81 mg EC tablet Aspirin 81 MG TABS   Refills: 0        Active      benazepril (Lotensin) 20 mg tablet Take 1 tablet (20 mg) by mouth 2 times a day. 180 tablet 3    budesonide (Rhinocort AQ) 32 mcg/actuation nasal spray Administer 1-2 sprays into each nostril once daily. 25.8 g 3    calcium cit/mag/D3/Zn//jarod (CALCIUM CITRATE PLUS ORAL) Take 1 tablet by mouth in the morning and 1 tablet before bedtime.      cyanocobalamin (Vitamin B-12) 500 mcg tablet Vitamin B 12 500 MCG Oral Tablet   Refills: 0        Start : 25-Nov-2020   Active      diclofenac sodium (Voltaren) 1 % gel Apply 4.5 inches (4 g) topically 2 times a day as needed (apply to affected area as needed for pain). 450 g 0    fenofibrate (Tricor) 145 mg tablet TAKE 1 TABLET DAILY 90 tablet 3    ferrous sulfate 325 (65 Fe) MG tablet Take 1 tablet (325 mg) by mouth once daily. With food      glucosamine/chondroitin/C/jarod (GLUCOSAM ALEXANDER DIP-CHONDROIT-C-MN ORAL) Take 1 capsule by mouth once daily. 1500 Complex Oral Capsule      hydrALAZINE (Apresoline) 50 mg tablet Take 1 tablet (50 mg) by mouth every 12 hours.      hydroCHLOROthiazide (HYDRODiuril) 25 mg tablet TAKE 1 TABLET DAILY 90 tablet 3    isosorbide mononitrate ER (Imdur) 30 mg 24 hr tablet Take 1 tablet (30 mg) by mouth once daily in the morning. Take before meals.      L. acidophilus/Bifid. animalis 32 billion cell capsule Take 1 capsule by mouth once daily.      lovastatin (Mevacor) 10 mg tablet TAKE 1 TABLET AT BEDTIME 90 tablet 3    metFORMIN (Glucophage) 500 mg tablet TAKE 1 TABLET TWICE A  tablet 1    multivitamin tablet Take 1 tablet by mouth 2 times a day.      nebivolol (Bystolic) 5 mg tablet Take 1 tablet (5 mg) by mouth once daily at bedtime. 90 tablet 1    nitroglycerin (Nitrostat) 0.4 mg SL tablet Place 1 tablet (0.4 mg) under the tongue every 5 minutes if needed for chest pain.      pantoprazole (ProtoNix) 40 mg EC tablet TAKE 1 TABLET DAILY 90 tablet 1    ticagrelor (Brilinta) 90 mg tablet Take 1 tablet (90 mg) by mouth 2 times a  day.      tirzepatide (Mounjaro) 5 mg/0.5 mL pen injector Inject 5 mg under the skin 1 (one) time per week. 2 mL 0    VITAMIN B COMPLEX ORAL Take 1 tablet by mouth once daily. Super B- Complex      [DISCONTINUED] tirzepatide (Mounjaro) 5 mg/0.5 mL pen injector Inject 5 mg under the skin 1 (one) time per week. 2 mL 0     No current facility-administered medications on file prior to visit.

## 2025-05-06 DIAGNOSIS — E11.9 CONTROLLED TYPE 2 DIABETES MELLITUS WITHOUT COMPLICATION, WITHOUT LONG-TERM CURRENT USE OF INSULIN: ICD-10-CM

## 2025-05-06 RX ORDER — METFORMIN HYDROCHLORIDE 500 MG/1
500 TABLET ORAL 2 TIMES DAILY
Qty: 180 TABLET | Refills: 1 | Status: SHIPPED | OUTPATIENT
Start: 2025-05-06

## 2025-05-07 DIAGNOSIS — E11.9 CONTROLLED TYPE 2 DIABETES MELLITUS WITHOUT COMPLICATION, WITHOUT LONG-TERM CURRENT USE OF INSULIN: Primary | ICD-10-CM

## 2025-05-07 RX ORDER — TIRZEPATIDE 7.5 MG/.5ML
7.5 INJECTION, SOLUTION SUBCUTANEOUS WEEKLY
Qty: 6 ML | Refills: 1 | Status: SHIPPED | OUTPATIENT
Start: 2025-05-07

## 2025-05-16 ENCOUNTER — EVALUATION (OUTPATIENT)
Dept: PHYSICAL THERAPY | Facility: CLINIC | Age: 60
End: 2025-05-16
Payer: COMMERCIAL

## 2025-05-16 DIAGNOSIS — M25.512 LEFT SHOULDER PAIN, UNSPECIFIED CHRONICITY: Primary | ICD-10-CM

## 2025-05-16 PROCEDURE — 97161 PT EVAL LOW COMPLEX 20 MIN: CPT | Mod: GP | Performed by: PHYSICAL THERAPIST

## 2025-05-16 ASSESSMENT — PAIN SCALES - GENERAL: PAINLEVEL_OUTOF10: 1

## 2025-05-16 ASSESSMENT — ENCOUNTER SYMPTOMS
OCCASIONAL FEELINGS OF UNSTEADINESS: 0
LOSS OF SENSATION IN FEET: 0
DEPRESSION: 0

## 2025-05-16 ASSESSMENT — PAIN - FUNCTIONAL ASSESSMENT: PAIN_FUNCTIONAL_ASSESSMENT: 0-10

## 2025-05-16 NOTE — PROGRESS NOTES
"  Physical Therapy  Physical Therapy Evaluation      Patient Name: Meir Richter \"Tc\"  MRN: [id number]  Today's Date: 5/16/2025  Time Calculation  Start Time: 1100  Stop Time: 1140  Time Calculation (min): 40 min  General  Reason for Referral: Left shoulder pain with mutliple diagnoses  Referred By: Rao  General Comment: Onset date: 4/14/2025, script 4/23/2025; No auth; Visit 1: Patient was lifting a heavy piece of furniture into his truck. He felt a \"pop\" and it started to hurt. He went to the ED. Patient reports pain for most of the time prior to seeing Dr. Yeh and he believes that he has torn his rotator cuff, labrum and possibly biceps tendon. Patient did have an injection and is reporting 90% better since the injection.  5/16/2025    TREATING DIAGNOSIS:  1. Left shoulder pain, unspecified chronicity  Referral to Physical Therapy    Follow Up In Physical Therapy          ASSESSMENT: Patient is a 61 yo male with acute left shoulder injury and pain resulting in limited participation in pain-free ADLs and inability to perform at their prior level of function specifically, lifting, reaching and some ADLs. Pt would benefit from skilled Physical Therapy services to address the impairments of:  loss of strength, muscle tension and pain  in order to return to safe and pain-free ADL's and prior level of function.    PLAN:      Planned Interventions include: therapeutic exercise, therapeutic activity, self-care home management, manual therapy, therapeutic activities, gait training, neuromuscular coordination, vasopneumatic, dry needling, electric stimulation, ultrasound, kinesiotaping, wound care    Goals:  Active       PT Problem       Patient will increase tolerance to activity with left UE  for any desired duration in order to participate in ADL, IADL, work, and cadence skills or activities.       Start:  05/16/25    Expected End:  08/15/25            Patient will increase left shoulder strength to 5/5 in " "order to participate in ADL, IADL, work, or leisure skills and activities.       Start:  05/16/25    Expected End:  08/15/25            Patient will be independent with symptom management in order to decrease pain to 0/10, in order to participate in ADL, IADL, work, or leisure skills and activities.       Start:  05/16/25    Expected End:  08/15/25            Patient will be compliant with HEP 90% of the time in order to return his shoulder to its previous functional levels.        Start:  05/16/25    Expected End:  08/15/25            Patient will improve the outcome measure score of the Quick Dash to <10% for increased independence and ease with performance of ADL, IADL, work, or leisure activities.       Start:  05/16/25    Expected End:  08/15/25            Patient Stated Goal: \"I just want the shoulder to be back to normal.\"       Start:  05/16/25    Expected End:  08/15/25              Plan of care was developed with input and agreement by the patient.  Potential to achieve goals:  Good    Subjective:    Pt goals for therapy: \"I just want the shoulder to be back to normal.\"  Pain Assessment: 0-10  0-10 (Numeric) Pain Score: 1 (worst pain since injection: 7-8/10)  Pain Type: Acute pain  Pain Location: Shoulder  Pain Orientation: Left  Aggravating Factors: Reaching overhead, Reaching behind back, and Lifting/Carrying   Relieving Factors: Rest     Precautions:  Precautions  Precautions Comment: Pain as guide    Medical History Form: Reviewed (scanned into chart)    Current Condition since injury:   better      Relevant Information (PMH & Previous Tests/Imaging):     Previous Interventions/Treatments: Injections   Prior Level of Function (PLOF)Prior Function Per Pt/Caregiver Report  Level of [place]: Independent with ADLs and functional transfers (with or without pain. Pain has changed some of his activity)  Receives Help From: Family (spouse)  Vocational: Retired  Hand Dominance: Right  Exercise/Physical " Activity: Active adult male  Work/School: Retired  Current ADL/IADL Status: Independent without pain     Patients Living Environment: Reviewed and no concern Home Living  Home Living Comment: [place] home, 3-4 steps to enter  MARIA A: Insidious lifting a dresser onto his truck bed, felt a pop    Primary Language: English    Social Determinants of health:  (-) Lack of Money                                      (-) poor physical home environment     (-) no job/ poor work conditions    (-) un-education/illiteracy   (-) traumatic childhood experiences            (-) lacks social supports/coping skills    (-) lacks healthy behaviors                     (-) lacks access to healthcare                   Red Flags:   REVIEW OF SYSTEMS/ RED FLAGS  (-) osteoporosis  (-) Fever/chills, SOB, loss of taste/smell  (-) Cough/ Sneeze   (-) balance difficulties/FALLS   (-) numbness/tingle  (-) weakness  (-) unremitting night pain   Sleep:  position:  (-) AM Stiffness:            (-) Unexplained Wt. Loss  (-) h/o CA   (-) Pacemaker  (-) Bowel/Bladder:            (-) saddle paresthesia    Objective:          UE FLOW SHEET  Shoulder:  Outcome: Shoulder Functional Rating Scale  Quick Dash: 29.55  Observations: Shoulder Observation  Cervical Posture: increased proximal thoracic kyphosis with head forward  Shoulder/Scapular/Rib postion: slightly protracted and rounded  Palpation:    AROM: Shoulder AROM    Shoulder AROM WFL: yes  R Shoulder flexion: (180°): 180  L Shoulder flexion: (180°): 180 with some discomfort at 90 and mildly worse at and range  R Shoulder Extension: (60°): 650  L Shoulder Extension: (60°): 50  R shoulder abduction: (180°): 180  L Shoulder abduction: (180°): 180 with end range discomfort  R Shoulder ER: (90°): 90  L shoulder ER: (90°): 90  R shoulder IR: (70°): 90  L shoulder IR: (70°): 90  PROM: Shoulder PROM  Shoulder PROM WFL: yes  MMT: Shoulder Strength  R shoulder flexion: (5/5): 5/5  L shoulder flexion: (5/5): 4+/5  R  shoulder extension: (5/5): 5/5  L shoulder extension: (5/5): 5/5  R shoulder abduction: (5/5): 5/5  L shoulder abduction: (5/5): 4+/5  R shoulder ER: (5/5): 5/5  L shoulder ER: (5/5): 4+/5  R shoulder IR: (5/5) : 5/5  L shoulder IR: (5/5): 5/5  Scapular MMT: Scapular MMT  R lower trapezius: (5/5): 4/5  L lower trapezius: (5/5): 4/5  R middle trapezius: (5/5): 4/5  L middle trapezius: (5/5): 4/5  R rhomboids: (5/5): 4/5  L rhomboids: (5/5): 4/5  R serratus anterior: (5/5): 4/5  L serratus anterior: (5/5): 4/5  Special Tests: Shoulder Special Tests  Painful arc: Negative: negative  Infraspinatus MMT: Negative: negative  Drop Arm Test: Negative: negative  Neer: (Negative): negative  Painful arc: (Negative): negative  Shoulder Instability: mild anterior hypermobility  Apprehension/Surprise Test: (Negative): negative  Biceps Load II: (Negative): mild positive  UE Special Tests Comments: Mild positive Rivas-Edi   Strength:   5/5    Precautions:   Precautions  Precautions Comment: Pain as guide    Medical History Form: Reviewed (scanned into chart)    EDUCATION: home exercise program, plan of care, activity modifications, pain management, and injury pathology   Access Code: [email or web address]  URL: [email or web address]  Date: 05/16/2025  Prepared by: URBAN Clement    Exercises  - Supine Shoulder Flexion Extension AAROM with Dowel  - 1 x daily - 5 x weekly - 1-2 sets - 10 reps  - Supine Shoulder Alphabet  - 1 x daily - 5 x weekly - 1-2 sets - 1 reps  - Sidelying Shoulder Abduction Palm Forward (Mirrored)  - 1 x daily - 5 x weekly - 2 sets - 10 reps  - Standing Shoulder Flexion to 90 Degrees with Dumbbells  - 1 x daily - 5 x weekly - 2 sets - 10 reps  - Scaption with Dumbbells  - 1 x daily - 5 x weekly - 2 sets - 10 reps    Treatments:   This therapist instructed and demonstrated interventions to patient, patient completed the following under direct supervision of this therapist:  PT Evaluation  Time Entry  PT Evaluation (Low) Time Entry: 40              Clarence Mario Becerra, PT

## 2025-05-27 ENCOUNTER — LAB (OUTPATIENT)
Dept: LAB | Facility: HOSPITAL | Age: 60
End: 2025-05-27
Payer: COMMERCIAL

## 2025-05-27 DIAGNOSIS — K90.9 INTESTINAL MALABSORPTION, UNSPECIFIED TYPE (HHS-HCC): ICD-10-CM

## 2025-05-27 DIAGNOSIS — D50.9 IRON DEFICIENCY ANEMIA, UNSPECIFIED IRON DEFICIENCY ANEMIA TYPE: ICD-10-CM

## 2025-05-27 LAB
ALBUMIN SERPL BCP-MCNC: 4.8 G/DL (ref 3.4–5)
ALP SERPL-CCNC: 37 U/L (ref 33–120)
ALT SERPL W P-5'-P-CCNC: 6 U/L (ref 10–52)
ANION GAP SERPL CALC-SCNC: 15 MMOL/L (ref 10–20)
AST SERPL W P-5'-P-CCNC: 16 U/L (ref 9–39)
BASOPHILS # BLD AUTO: 0.02 X10*3/UL (ref 0–0.1)
BASOPHILS NFR BLD AUTO: 0.3 %
BILIRUB SERPL-MCNC: 0.5 MG/DL (ref 0–1.2)
BUN SERPL-MCNC: 18 MG/DL (ref 6–23)
CALCIUM SERPL-MCNC: 10.3 MG/DL (ref 8.6–10.3)
CHLORIDE SERPL-SCNC: 100 MMOL/L (ref 98–107)
CO2 SERPL-SCNC: 27 MMOL/L (ref 21–32)
CREAT SERPL-MCNC: 1.09 MG/DL (ref 0.5–1.3)
EGFRCR SERPLBLD CKD-EPI 2021: 78 ML/MIN/1.73M*2
EOSINOPHIL # BLD AUTO: 0.08 X10*3/UL (ref 0–0.7)
EOSINOPHIL NFR BLD AUTO: 1.3 %
ERYTHROCYTE [DISTWIDTH] IN BLOOD BY AUTOMATED COUNT: 13.5 % (ref 11.5–14.5)
GLUCOSE SERPL-MCNC: 94 MG/DL (ref 74–99)
HCT VFR BLD AUTO: 40.8 % (ref 41–52)
HGB BLD-MCNC: 13.3 G/DL (ref 13.5–17.5)
IMM GRANULOCYTES # BLD AUTO: 0.01 X10*3/UL (ref 0–0.7)
IMM GRANULOCYTES NFR BLD AUTO: 0.2 % (ref 0–0.9)
LYMPHOCYTES # BLD AUTO: 1.43 X10*3/UL (ref 1.2–4.8)
LYMPHOCYTES NFR BLD AUTO: 23.3 %
MCH RBC QN AUTO: 30.2 PG (ref 26–34)
MCHC RBC AUTO-ENTMCNC: 32.6 G/DL (ref 32–36)
MCV RBC AUTO: 93 FL (ref 80–100)
MONOCYTES # BLD AUTO: 0.79 X10*3/UL (ref 0.1–1)
MONOCYTES NFR BLD AUTO: 12.9 %
NEUTROPHILS # BLD AUTO: 3.81 X10*3/UL (ref 1.2–7.7)
NEUTROPHILS NFR BLD AUTO: 62 %
PLATELET # BLD AUTO: 266 X10*3/UL (ref 150–450)
POTASSIUM SERPL-SCNC: 3.9 MMOL/L (ref 3.5–5.3)
PROT SERPL-MCNC: 7.2 G/DL (ref 6.4–8.2)
RBC # BLD AUTO: 4.4 X10*6/UL (ref 4.5–5.9)
SODIUM SERPL-SCNC: 138 MMOL/L (ref 136–145)
WBC # BLD AUTO: 6.1 X10*3/UL (ref 4.4–11.3)

## 2025-05-27 PROCEDURE — 36415 COLL VENOUS BLD VENIPUNCTURE: CPT

## 2025-05-27 PROCEDURE — 85025 COMPLETE CBC W/AUTO DIFF WBC: CPT

## 2025-05-27 PROCEDURE — 80053 COMPREHEN METABOLIC PANEL: CPT

## 2025-06-02 ENCOUNTER — OFFICE VISIT (OUTPATIENT)
Dept: HEMATOLOGY/ONCOLOGY | Facility: CLINIC | Age: 60
End: 2025-06-02
Payer: COMMERCIAL

## 2025-06-02 VITALS
HEART RATE: 62 BPM | OXYGEN SATURATION: 97 % | BODY MASS INDEX: 36.05 KG/M2 | WEIGHT: 237.1 LBS | TEMPERATURE: 97.2 F | RESPIRATION RATE: 17 BRPM | DIASTOLIC BLOOD PRESSURE: 74 MMHG | SYSTOLIC BLOOD PRESSURE: 117 MMHG

## 2025-06-02 DIAGNOSIS — K90.9 INTESTINAL MALABSORPTION, UNSPECIFIED TYPE (HHS-HCC): ICD-10-CM

## 2025-06-02 DIAGNOSIS — D50.9 IRON DEFICIENCY ANEMIA, UNSPECIFIED IRON DEFICIENCY ANEMIA TYPE: ICD-10-CM

## 2025-06-02 PROCEDURE — 3074F SYST BP LT 130 MM HG: CPT

## 2025-06-02 PROCEDURE — 99215 OFFICE O/P EST HI 40 MIN: CPT

## 2025-06-02 PROCEDURE — 4010F ACE/ARB THERAPY RXD/TAKEN: CPT

## 2025-06-02 PROCEDURE — 3078F DIAST BP <80 MM HG: CPT

## 2025-06-02 ASSESSMENT — PAIN SCALES - GENERAL: PAINLEVEL_OUTOF10: 0-NO PAIN

## 2025-06-02 NOTE — PROGRESS NOTES
"[facility]    PATIENT VISIT INFORMATION    Visit Type: Follow up visit     Referring Provider: Ghulam Rabago DO  Reason for referral: Iron deficiency anemia  Primary Practice Provider: Ghulam Rabago DO    CANCER/HEMATOLGOY HISTORY    60-year-old  male presents for evaluation of iron anemia.  Referred by his primary care physician, Dr. Ghulam Rabago.  Consistent anemia has been noted over the last 4 years dating back to May 2020.  Hemoglobin ranging from 7.6-13.2.  Chemistry shows normal liver and kidney function.  ALT slightly decreased several years.  History of diverticulitis and GI bleeds.  Patient has been taking oral iron for many years along with multivitamins.  Medical history includes hypertension, atherosclerotic heart disease, hypercholesteremia, A-fib, sinus inflammation, diabetes mellitus, class II obesity, bariatric surgery Haydee-en-Y 2018, diverticulosis, bowel resection, malabsorption, gastritis, acid reflux, gout, cervical radiculopathy, cardiac stent, active asthma, WM.    Emergency room and hospitalizations:  Patient reports fall in November of 7/20/2024 on Brilinta.  Head CT negative for hemorrhage fracture or other acute trauma or abnormality.  CT C-spine mild asymmetric widening of the left C4-C5.  Degenerative etiology.  No fracture or traumatic issues identified.    Hospitalized 4/1/2021 diverticulitis and GI bleed.  Started on PPI.    Colonoscopy 10/12/2022 diverticulosis of the large intestine without perforation.  EGD 4/2/2021 Haydee-en-Y anastomosis characterized congestion with erythema and friable mucosa.  Advised to not take any NSAIDs.  Colonoscopy 4/2/2021 shows diverticulosis in sigmoid colon.  Red blood clots washed with diverticula.    HISTORY OF PRESENT ILLNESS     ID Statement: Meir Richter is a 60-year-old male    Chief Complaint: \"Feel pretty good.\"    Interval History:   Presents present for follow up.   One day dizziness with " orthostatics.  Mention to his cardiologist and he reports there was no concern noted.  Wore heart monitor for abnormal rhythm and following cardiology. Reports nothing found.  He plans on a 2-month trip cross-country with his girlfriend.    Continues occasionally SOB with exertion. Less active than he should be and will be more active with exercise.  He is taking Mounjaro and tolerating.   Dark stool occasional from iron.  History of GI bleed.  Able to do chores. Eats healthy and watching portion size.  Needs to lose weight intentionally.    Denies F/C/recent illness/infection, drenching night sweats, unintentional weight loss, anorexia/loss of appetite, HA, dizziness, lightheadedness, PICA, acute changes in vision/hearing, palpitations, CP, SOB, n/v/d/c/abd pain, bleeding, urinary issues, haematuria, LAD, peripheral neuropathy, bone pain, bruising, sores, or rashes.      PAST/CURRENT HISTORY     MEDICAL/SURGICAL HISTORY  Past Medical History:   Diagnosis Date    Coronary artery disease     Diabetes mellitus (Multi)     Hypertension     Morbid (severe) obesity due to excess calories (Multi) 11/01/2016    Morbid obesity    Other allergy status, other than to drugs and biological substances     Environmental allergies    Other conditions influencing health status     Ulcer    Palpitations     Heart palpitations    Personal history of other diseases of the circulatory system 04/09/2021    History of malignant hypertension    Personal history of other diseases of the circulatory system     History of atrial fibrillation    Personal history of other diseases of the circulatory system     History of cardiac murmur    Personal history of other diseases of the digestive system 04/07/2014    History of gastroesophageal reflux (GERD)    Personal history of other diseases of the digestive system 04/07/2014    History of diverticulitis of colon    Personal history of other diseases of the musculoskeletal system and connective  tissue     History of gout    Personal history of other diseases of the nervous system and sense organs 2014    History of sleep apnea    Personal history of other diseases of the nervous system and sense organs     History of glaucoma    Personal history of other diseases of the respiratory system     History of bronchitis    Personal history of other endocrine, nutritional and metabolic disease     History of diabetes mellitus    Personal history of other endocrine, nutritional and metabolic disease     History of obesity    Personal history of other medical treatment     History of stress test    Personal history of other specified conditions     History of headache    Personal history of other specified conditions     History of impaired glucose tolerance      Past Surgical History:   Procedure Laterality Date    CARDIAC CATHETERIZATION N/A 10/4/2023    Procedure: Left Heart Cath;  Surgeon: Jase Kumar MD;  Location: [facility] Cardiac Cath Lab;  Service: Cardiovascular;  Laterality: N/A;    CARDIAC CATHETERIZATION N/A 10/4/2023    Procedure: PCI;  Surgeon: Jaswinder Botello MD;  Location: [facility] Cardiac Cath Lab;  Service: Cardiovascular;  Laterality: N/A;    COLECTOMY PARTIAL / TOTAL  2014    Partial Colectomy - Sigmoid    COLONOSCOPY  2017    Colonoscopy    HAND SURGERY  08/15/2016    Hand Surgery                                                                                                                                                          STOMACH SURGERY  2017    Gastric Surgery        SOCIAL HISTORY  -Lives with significant other, Marley   -Work place: Retired [employer]    -Tobacco/smokeless use: Denies (hx chewing tobacco quit 7 years ago)   -Alcohol: Max 5-6 bourbon per week or 12 beers per week  -Illicit drug or marijuana use: Denies   -Anabaptist or Spiritual beliefs: Denies   -Social Determinates of Health Concerns:    FAMILY HISTORY  -Mom  age 43 y/o  Leukemia   -Sister cardiomyopathy   -Dad  heart attach, hx colon issues unsure diagnoses  -No other known history of hematologic, bleeding, clotting, autoimmune, genetic, or malignant disorders in the family.     OCCUPATIONAL/ENVIRONMENTAL HISTORY/EXPOSURES:  -None reported    Active Problems, Allergy List, Medication List, and PMH/PSH/FH/Social Hx have been reviewed and reconciled in chart. Updates made when necessary.     REVIEW OF SYSTEMS   A review of systems has been completed and are negative for complaints except what is stated in the assessment, HPI, IH, ROS, and/or past medical history.    ALLERGIES AND MEDICATIONS     Allergies and Intolerances:   No Known Allergies   Medication Profile:   Current Outpatient Medications   Medication Instructions    albuterol 90 mcg/actuation inhaler 1-2 puffs, inhalation, As needed, Every 4 to 6 hours    amLODIPine (NORVASC) 5 mg, oral, 2 times daily    ascorbic acid, vitamin C, 500 mg capsule 1 capsule, Daily    aspirin 81 mg EC tablet Aspirin 81 MG TABS   Refills: 0       Active    benazepril (LOTENSIN) 20 mg, oral, 2 times daily    budesonide (Rhinocort AQ) 32 mcg/actuation nasal spray 1-2 sprays, Each Nostril, Daily    calcium cit/mag/D3/Zn//jarod (CALCIUM CITRATE PLUS ORAL) 1 tablet, 2 times daily    cyanocobalamin (Vitamin B-12) 500 mcg tablet Vitamin B 12 500 MCG Oral Tablet   Refills: 0        Start : 2020   Active    diclofenac sodium (VOLTAREN) 4 g, Topical, 2 times daily PRN    fenofibrate (Tricor) 145 mg tablet TAKE 1 TABLET DAILY    ferrous sulfate 325 (65 Fe) MG tablet 1 tablet, Daily    glucosamine/chondroitin/C/jarod (GLUCOSAM ALEXANDER DIP-CHONDROIT-C-MN ORAL) 1 capsule, Daily    hydrALAZINE (Apresoline) 50 mg tablet 1 tablet, Every 12 hours scheduled (0630,1830)    hydroCHLOROthiazide (HYDRODIURIL) 25 mg, oral, Daily    isosorbide mononitrate ER (IMDUR) 30 mg, Daily before breakfast    L. acidophilus/Bifid. animalis 32 billion cell capsule 1  "capsule, Daily    lovastatin (Mevacor) 10 mg tablet TAKE 1 TABLET AT BEDTIME    metFORMIN (GLUCOPHAGE) 500 mg, oral, 2 times daily    Mounjaro 7.5 mg, subcutaneous, Weekly    multivitamin tablet 1 tablet, 2 times daily    nebivolol (BYSTOLIC) 5 mg, oral, Nightly    nitroglycerin (NITROSTAT) 0.4 mg, Every 5 min PRN    pantoprazole (PROTONIX) 40 mg, oral, Daily    ticagrelor (Brilinta) 90 mg tablet 1 tablet, 2 times daily    VITAMIN B COMPLEX ORAL 1 tablet, Daily      Available Vaccination Record:   Immunization History   Administered Date(s) Administered    DTaP vaccine, pediatric  (INFANRIX) 11/29/2012    DTaP, Unspecified 11/29/2012    Flu vaccine (IIV4), preservative free *Check age/dose* 10/31/2016    Flu vaccine, trivalent, preservative free, age 6 months and greater (Fluarix/Fluzone/Flulaval) 10/01/2024    Influenza, seasonal, injectable 10/26/2007, 01/18/2010, 11/22/2010, 11/29/2012, 12/02/2014, 10/26/2022    Moderna SARS-CoV-2 Vaccination 04/20/2021, 05/20/2021, 12/15/2021    Pneumococcal conjugate vaccine, 13-valent (PREVNAR 13) 10/31/2016    Pneumococcal polysaccharide vaccine, 23-valent, age 2 years and older (PNEUMOVAX 23) 06/26/2008      PHYSICAL EXAM     Vital Signs/Measurements:       4/21/2025     1:47 PM 4/21/2025     3:25 PM 4/23/2025     9:24 AM 4/23/2025    12:26 PM 4/30/2025     9:24 AM 4/30/2025     9:26 AM 6/2/2025     9:54 AM   Vitals   Systolic 135 129  126 121 103 117   Diastolic 73 76  82 74 67 74   BP Location Right arm    Right arm Right arm Left arm   Heart Rate 73 78  76 60  62   Temp 35.9 °C (96.6 °F)    36.3 °C (97.3 °F)  36.2 °C (97.2 °F)   Resp 17      17   Height   1.727 m (5' 8\")  1.727 m (5' 8\")     Weight (lb) 250.33  245 249 245  237.11   BMI 38.06 kg/m2  37.25 kg/m2 37.86 kg/m2 37.25 kg/m2  36.05 kg/m2   BSA (m2) 2.34 m2  2.31 m2 2.33 m2 2.31 m2  2.28 m2   Visit Report   Report    Report Report    Report Report Report Report      Performance:   ECOG Performance Status: 0 "     Grade ECOG performance status   0 Fully active, able to carry on all pre-disease performance without restriction   1 Restricted in physically strenuous activity but ambulatory and able to carry out work of a light or sedentary nature, e.g., light housework, office work   2 Ambulatory and capable of all selfcare but unable to carry out any work activities; Up and about more than 50% of waking hours   3 Capable of only limited selfcare, confined to bed or chair more than 50% of waking hours   4 Completely disabled; Cannot carry out any selfcare; Totally confined to bed or chair   5 Dead     Physical Exam:  Physical Exam  Vitals reviewed.   Constitutional:       Appearance: Normal appearance. He is normal weight.   HENT:      Head: Normocephalic and atraumatic.      Mouth/Throat:      Mouth: Mucous membranes are moist.      Pharynx: Oropharynx is clear.   Eyes:      Extraocular Movements: Extraocular movements intact.      Conjunctiva/sclera: Conjunctivae normal.      Pupils: Pupils are equal, round, and reactive to light.   Cardiovascular:      Rate and Rhythm: Normal rate and regular rhythm.      Pulses: Normal pulses.      Heart sounds: Normal heart sounds.   Pulmonary:      Effort: Pulmonary effort is normal.   Abdominal:      General: Bowel sounds are normal.      Palpations: Abdomen is soft.   Musculoskeletal:         General: Normal range of motion.      Cervical back: Normal range of motion.   Skin:     General: Skin is warm and dry.      Capillary Refill: Capillary refill takes less than 2 seconds.   Neurological:      General: No focal deficit present.      Mental Status: He is alert and oriented to person, place, and time.   Psychiatric:         Mood and Affect: Mood normal.         Behavior: Behavior normal.         Thought Content: Thought content normal.         Judgment: Judgment normal.        RESULTS/DATA     Labs:     Lab Results   Component Value Date    WBC 6.1 05/27/2025    NEUTROABS 3.81  05/27/2025    IGABSOL 0.01 05/27/2025    LYMPHSABS 1.43 05/27/2025    MONOSABS 0.79 05/27/2025    EOSABS 0.08 05/27/2025    BASOSABS 0.02 05/27/2025    RBC 4.40 (L) 05/27/2025    MCV 93 05/27/2025    MCHC 32.6 05/27/2025    HGB 13.3 (L) 05/27/2025    HCT 40.8 (L) 05/27/2025     05/27/2025       Lab Results   Component Value Date    CREATININE 1.09 05/27/2025    BUN 18 05/27/2025    EGFR 78 05/27/2025     05/27/2025    K 3.9 05/27/2025     05/27/2025    CO2 27 05/27/2025      Lab Results   Component Value Date    ALT 6 (L) 05/27/2025    AST 16 05/27/2025    ALKPHOS 37 05/27/2025    BILITOT 0.5 05/27/2025      Lab Results   Component Value Date    TSH 2.55 03/24/2025     Lab Results   Component Value Date    TSH 2.55 03/24/2025     Lab Results   Component Value Date    IRON 106 03/24/2025    TIBC 430 03/24/2025    FERRITIN 191 03/24/2025      Lab Results   Component Value Date    [id number] 570 03/24/2025      Lab Results   Component Value Date    FOLATE >24.0 03/24/2025       Lab Results   Component Value Date    CRP 0.34 03/24/2025        Lab Results   Component Value Date    SPEP Normal. 03/24/2025       Lab Results   Component Value Date    WBC 6.1 05/27/2025    NEUTROABS 3.81 05/27/2025    IGABSOL 0.01 05/27/2025    LYMPHSABS 1.43 05/27/2025    MONOSABS 0.79 05/27/2025    EOSABS 0.08 05/27/2025    BASOSABS 0.02 05/27/2025    RBC 4.40 (L) 05/27/2025    MCV 93 05/27/2025    MCHC 32.6 05/27/2025    HGB 13.3 (L) 05/27/2025    HCT 40.8 (L) 05/27/2025     05/27/2025     Lab Results   Component Value Date    RETICCTPCT 1.7 03/24/2025      Lab Results   Component Value Date    CREATININE 1.09 05/27/2025    BUN 18 05/27/2025    EGFR 78 05/27/2025     05/27/2025    K 3.9 05/27/2025     05/27/2025    CO2 27 05/27/2025      Lab Results   Component Value Date    ALT 6 (L) 05/27/2025    AST 16 05/27/2025    ALKPHOS 37 05/27/2025    BILITOT 0.5 05/27/2025      Lab Results   Component Value  Date    TSH 2.55 03/24/2025     Lab Results   Component Value Date    TSH 2.55 03/24/2025     Lab Results   Component Value Date    IRON 106 03/24/2025    TIBC 430 03/24/2025    FERRITIN 191 03/24/2025      Lab Results   Component Value Date    QOQPQKZK35 570 03/24/2025      Lab Results   Component Value Date    FOLATE >24.0 03/24/2025     Lab Results   Component Value Date    SEDRATE 11 03/24/2025      Lab Results   Component Value Date    CRP 0.34 03/24/2025        Lab Results   Component Value Date     03/24/2025     Lab Results   Component Value Date    HAPTOGLOBIN 175 03/24/2025     Lab Results   Component Value Date    SPEP Normal. 03/24/2025     Lab Results   Component Value Date     03/24/2025    IGM 46 03/24/2025     03/24/2025        Radiology/Studies:   US abdomen complete  4/4/2025  IMPRESSION:  1. Mild hepatomegaly with heterogenous echotexture and slight contour  irregularity could be related to underlying diffuse hepatocellular  dysfunction/steatosis without gross lesions.      ASSESSMENT/PLAN     Assessment and Plan:   #1.  Iron deficiency anemia  59-year-old  male presents for evaluation of iron anemia.  Referred by his primary care physician, Dr. Ghulam Rabago.  Consistent anemia has been noted over the last 4 years dating back to May 2020.  Hemoglobin ranging from 7.6-13.2.  Chemistry shows normal liver and kidney function.  ALT slightly decreased several years.  History of diverticulitis and GI bleeds.  Patient has been taking oral iron for many years along with multivitamins.    Medical history includes hypertension, atherosclerotic heart disease, hypercholesteremia, A-fib, sinus inflammation, diabetes mellitus, class II obesity, bariatric surgery Haydee-en-Y 2018, diverticulosis, bowel resection, malabsorption, gastritis, acid reflux, gout, cervical radiculopathy, cardiac stent, active asthma, WM.    Workup:  Discussed workup with patient.  Reassess nutritional labs,  inflammatory markers, SPEP, TSH, uric acid, kappa light chain, heavy chain, haptoglobin, reticulocytes, LDH.  Patient drinks evenings either 5-6 bourbon per week or 12 beers.  Also chews tobacco occasionally during stress.   Discussed likely malabsorption due to Haydee-en-Y.  If possible he would prefer to stay on oral iron.  He is willing to have IV iron if it is needed.  Patient states he will be seeing nephrology for uncontrolled blood pressure.    Discussion of Workup:  Workup primarily negative. Kappa lc slightly elevated, likely inflammatory. No M-protein. Continues oral iron with no improvement. HGB 12.5, tsat 25%, ferritin 191. Elects to have IV iron one time dose. Advised alcohol cessation. Fatty liver. Advised hepatology. Patient elects to wait till IV iron infusion. Hx of bariatric surgery. Liley malabsorption as well. Follow in 2 months.       Visit 6/2/2025 CBC shows hemoglobin of 33.3 improved from previous results.  Hematocrit and RBCs also increasing.  No other abnormality on CBC.  We will check iron studies at next visit.  GFR is 78 ALT slightly low at 6 no other LFT abnormality. Congratulated him on healthier life style. He is doing well.     ~Received IV Feraheme 510 mg on 4/28/2025.  ~Repeat iron studies in 3 months.    ~Visit 6 months with labs.      **Please follow with specialties as scheduled for other health needs and routine screenings.**  Problem List Items Addressed This Visit    None  Visit Diagnoses         Codes      Iron deficiency anemia, unspecified iron deficiency anemia type     D50.9    Relevant Orders    CBC and Auto Differential    Ferritin    Iron and TIBC    Comprehensive Metabolic Panel    Clinic Appointment Request    Oncology Line Draw Appointment Request    CBC and Auto Differential    Comprehensive metabolic panel    Iron and TIBC    Vitamin B12    Ferritin      Intestinal malabsorption, unspecified type ([facility])     K90.9    Relevant Orders    CBC and Auto Differential     Ferritin    Iron and TIBC    Comprehensive Metabolic Panel    Clinic Appointment Request    Oncology Line Draw Appointment Request    CBC and Auto Differential    Comprehensive metabolic panel    Iron and TIBC    Vitamin B12    Ferritin           Follow up:    RTC:  -3 months labs   -6 months visit with labs    Medications:  -Continue oral iron daily    Imaging/Testing:  -NA    Referral(s):  -N/A    Other Pertinent Appointments:  -PCP 10/22/2025      Patient Discussion Summary:  Discussed plan of care in detail. Patient states understanding and in agreement to the plan. Answered all questions to there liking. The patient will call with any additional questions and/or concerns.    Thank you for allowing me to participate in your care. It was a pleasure meeting you.    Sincerely,  Fannie Adam, APRN-CNP    Hematology/Oncology Clinical Nurse Practitioner     [facility]   [id number]  [place] [id number]  Office #: [phone]    DISCLAIMER:   In preparing for this visit and writing this note, I reviewed all the previous electronic medical records (testing, labs, imaging, and other procedures, provider notes, and medical charts) of the patient available in the physician portal pertinent to patient care. Significant findings which helped in decision making are recorded in this chart.    This document may have been written by voice recognition software.  There may be some incorrect wording, spelling and/or spelling errors or punctuation errors that were not corrected prior to committing the note to the medical record. Please request clarification if there is documentation error or clarification is needed.   Time based billing: Please see documentation within this specific encounter.

## 2025-06-17 DIAGNOSIS — E78.00 HYPERCHOLESTEROLEMIA: ICD-10-CM

## 2025-06-19 RX ORDER — LOVASTATIN 10 MG/1
10 TABLET ORAL NIGHTLY
Qty: 90 TABLET | Refills: 1 | Status: SHIPPED | OUTPATIENT
Start: 2025-06-19

## 2025-07-19 ENCOUNTER — HOSPITAL ENCOUNTER (EMERGENCY)
Facility: HOSPITAL | Age: 60
Discharge: HOME | End: 2025-07-20
Attending: EMERGENCY MEDICINE
Payer: COMMERCIAL

## 2025-07-19 ENCOUNTER — APPOINTMENT (OUTPATIENT)
Dept: RADIOLOGY | Facility: HOSPITAL | Age: 60
End: 2025-07-19
Payer: COMMERCIAL

## 2025-07-19 VITALS
WEIGHT: 221 LBS | HEIGHT: 68 IN | TEMPERATURE: 96.8 F | RESPIRATION RATE: 16 BRPM | DIASTOLIC BLOOD PRESSURE: 79 MMHG | OXYGEN SATURATION: 97 % | HEART RATE: 85 BPM | SYSTOLIC BLOOD PRESSURE: 155 MMHG | BODY MASS INDEX: 33.49 KG/M2

## 2025-07-19 DIAGNOSIS — N20.0 KIDNEY STONE: Primary | ICD-10-CM

## 2025-07-19 LAB
ALBUMIN SERPL BCP-MCNC: 4.8 G/DL (ref 3.4–5)
ALP SERPL-CCNC: 31 U/L (ref 33–120)
ALT SERPL W P-5'-P-CCNC: 7 U/L (ref 10–52)
ANION GAP SERPL CALC-SCNC: 18 MMOL/L (ref 10–20)
AST SERPL W P-5'-P-CCNC: 27 U/L (ref 9–39)
BASOPHILS # BLD AUTO: 0.02 X10*3/UL (ref 0–0.1)
BASOPHILS NFR BLD AUTO: 0.2 %
BILIRUB SERPL-MCNC: 0.5 MG/DL (ref 0–1.2)
BUN SERPL-MCNC: 23 MG/DL (ref 6–23)
CALCIUM SERPL-MCNC: 10.2 MG/DL (ref 8.6–10.3)
CHLORIDE SERPL-SCNC: 102 MMOL/L (ref 98–107)
CO2 SERPL-SCNC: 22 MMOL/L (ref 21–32)
CREAT SERPL-MCNC: 1.34 MG/DL (ref 0.5–1.3)
EGFRCR SERPLBLD CKD-EPI 2021: 61 ML/MIN/1.73M*2
EOSINOPHIL # BLD AUTO: 0.02 X10*3/UL (ref 0–0.7)
EOSINOPHIL NFR BLD AUTO: 0.2 %
ERYTHROCYTE [DISTWIDTH] IN BLOOD BY AUTOMATED COUNT: 14.2 % (ref 11.5–14.5)
GLUCOSE SERPL-MCNC: 129 MG/DL (ref 74–99)
HCT VFR BLD AUTO: 40 % (ref 41–52)
HGB BLD-MCNC: 13.4 G/DL (ref 13.5–17.5)
IMM GRANULOCYTES # BLD AUTO: 0.04 X10*3/UL (ref 0–0.7)
IMM GRANULOCYTES NFR BLD AUTO: 0.5 % (ref 0–0.9)
LACTATE SERPL-SCNC: 1 MMOL/L (ref 0.4–2)
LIPASE SERPL-CCNC: 14 U/L (ref 9–82)
LYMPHOCYTES # BLD AUTO: 0.74 X10*3/UL (ref 1.2–4.8)
LYMPHOCYTES NFR BLD AUTO: 8.4 %
MCH RBC QN AUTO: 31.1 PG (ref 26–34)
MCHC RBC AUTO-ENTMCNC: 33.5 G/DL (ref 32–36)
MCV RBC AUTO: 93 FL (ref 80–100)
MONOCYTES # BLD AUTO: 0.74 X10*3/UL (ref 0.1–1)
MONOCYTES NFR BLD AUTO: 8.4 %
NEUTROPHILS # BLD AUTO: 7.25 X10*3/UL (ref 1.2–7.7)
NEUTROPHILS NFR BLD AUTO: 82.3 %
NRBC BLD-RTO: 0 /100 WBCS (ref 0–0)
PLATELET # BLD AUTO: 253 X10*3/UL (ref 150–450)
POTASSIUM SERPL-SCNC: 4.4 MMOL/L (ref 3.5–5.3)
PROT SERPL-MCNC: 7.8 G/DL (ref 6.4–8.2)
RBC # BLD AUTO: 4.31 X10*6/UL (ref 4.5–5.9)
SODIUM SERPL-SCNC: 138 MMOL/L (ref 136–145)
WBC # BLD AUTO: 8.8 X10*3/UL (ref 4.4–11.3)

## 2025-07-19 PROCEDURE — 74177 CT ABD & PELVIS W/CONTRAST: CPT | Mod: FOREIGN READ | Performed by: RADIOLOGY

## 2025-07-19 PROCEDURE — 80053 COMPREHEN METABOLIC PANEL: CPT | Performed by: PHYSICIAN ASSISTANT

## 2025-07-19 PROCEDURE — 85025 COMPLETE CBC W/AUTO DIFF WBC: CPT | Performed by: PHYSICIAN ASSISTANT

## 2025-07-19 PROCEDURE — 96375 TX/PRO/DX INJ NEW DRUG ADDON: CPT

## 2025-07-19 PROCEDURE — 83605 ASSAY OF LACTIC ACID: CPT | Performed by: PHYSICIAN ASSISTANT

## 2025-07-19 PROCEDURE — 74177 CT ABD & PELVIS W/CONTRAST: CPT

## 2025-07-19 PROCEDURE — 36415 COLL VENOUS BLD VENIPUNCTURE: CPT | Performed by: PHYSICIAN ASSISTANT

## 2025-07-19 PROCEDURE — 83690 ASSAY OF LIPASE: CPT | Performed by: PHYSICIAN ASSISTANT

## 2025-07-19 PROCEDURE — 2550000001 HC RX 255 CONTRASTS: Performed by: PHYSICIAN ASSISTANT

## 2025-07-19 PROCEDURE — 2500000004 HC RX 250 GENERAL PHARMACY W/ HCPCS (ALT 636 FOR OP/ED)

## 2025-07-19 PROCEDURE — 99285 EMERGENCY DEPT VISIT HI MDM: CPT | Mod: 25 | Performed by: EMERGENCY MEDICINE

## 2025-07-19 PROCEDURE — 96374 THER/PROPH/DIAG INJ IV PUSH: CPT | Mod: 59

## 2025-07-19 RX ORDER — ONDANSETRON HYDROCHLORIDE 2 MG/ML
4 INJECTION, SOLUTION INTRAVENOUS ONCE
Status: COMPLETED | OUTPATIENT
Start: 2025-07-19 | End: 2025-07-19

## 2025-07-19 RX ORDER — MORPHINE SULFATE 4 MG/ML
4 INJECTION INTRAVENOUS ONCE
Status: COMPLETED | OUTPATIENT
Start: 2025-07-19 | End: 2025-07-19

## 2025-07-19 RX ORDER — MORPHINE SULFATE 4 MG/ML
INJECTION INTRAVENOUS
Status: COMPLETED
Start: 2025-07-19 | End: 2025-07-19

## 2025-07-19 RX ORDER — ONDANSETRON HYDROCHLORIDE 2 MG/ML
INJECTION, SOLUTION INTRAVENOUS
Status: COMPLETED
Start: 2025-07-19 | End: 2025-07-19

## 2025-07-19 RX ADMIN — IOHEXOL 75 ML: 350 INJECTION, SOLUTION INTRAVENOUS at 23:21

## 2025-07-19 RX ADMIN — MORPHINE SULFATE 4 MG: 4 INJECTION INTRAVENOUS at 23:05

## 2025-07-19 RX ADMIN — ONDANSETRON HYDROCHLORIDE 4 MG: 2 INJECTION, SOLUTION INTRAVENOUS at 23:04

## 2025-07-19 RX ADMIN — ONDANSETRON 4 MG: 2 INJECTION, SOLUTION INTRAMUSCULAR; INTRAVENOUS at 23:04

## 2025-07-19 ASSESSMENT — PAIN DESCRIPTION - LOCATION: LOCATION: ABDOMEN

## 2025-07-19 ASSESSMENT — LIFESTYLE VARIABLES
HAVE YOU EVER FELT YOU SHOULD CUT DOWN ON YOUR DRINKING: NO
EVER HAD A DRINK FIRST THING IN THE MORNING TO STEADY YOUR NERVES TO GET RID OF A HANGOVER: NO
TOTAL SCORE: 0
EVER FELT BAD OR GUILTY ABOUT YOUR DRINKING: NO
HAVE PEOPLE ANNOYED YOU BY CRITICIZING YOUR DRINKING: NO

## 2025-07-19 ASSESSMENT — PAIN SCALES - GENERAL: PAINLEVEL_OUTOF10: 6

## 2025-07-19 ASSESSMENT — PAIN DESCRIPTION - PAIN TYPE: TYPE: ACUTE PAIN

## 2025-07-19 ASSESSMENT — PAIN - FUNCTIONAL ASSESSMENT: PAIN_FUNCTIONAL_ASSESSMENT: 0-10

## 2025-07-19 ASSESSMENT — PAIN DESCRIPTION - ORIENTATION: ORIENTATION: RIGHT;LOWER

## 2025-07-20 PROBLEM — N20.0 KIDNEY STONE: Status: ACTIVE | Noted: 2025-07-20

## 2025-07-20 LAB
APPEARANCE UR: CLEAR
BILIRUB UR STRIP.AUTO-MCNC: NEGATIVE MG/DL
CAOX CRY #/AREA UR COMP ASSIST: ABNORMAL /HPF
COLOR UR: YELLOW
GLUCOSE UR STRIP.AUTO-MCNC: NORMAL MG/DL
HOLD SPECIMEN: NORMAL
KETONES UR STRIP.AUTO-MCNC: ABNORMAL MG/DL
LEUKOCYTE ESTERASE UR QL STRIP.AUTO: ABNORMAL
MUCOUS THREADS #/AREA URNS AUTO: ABNORMAL /LPF
NITRITE UR QL STRIP.AUTO: NEGATIVE
PH UR STRIP.AUTO: 5 [PH]
PROT UR STRIP.AUTO-MCNC: NEGATIVE MG/DL
RBC # UR STRIP.AUTO: NEGATIVE MG/DL
RBC #/AREA URNS AUTO: ABNORMAL /HPF
SP GR UR STRIP.AUTO: >1.05
UROBILINOGEN UR STRIP.AUTO-MCNC: NORMAL MG/DL
WBC #/AREA URNS AUTO: ABNORMAL /HPF

## 2025-07-20 PROCEDURE — 2500000004 HC RX 250 GENERAL PHARMACY W/ HCPCS (ALT 636 FOR OP/ED): Mod: JZ | Performed by: PHYSICIAN ASSISTANT

## 2025-07-20 PROCEDURE — 81001 URINALYSIS AUTO W/SCOPE: CPT | Performed by: PHYSICIAN ASSISTANT

## 2025-07-20 PROCEDURE — 96375 TX/PRO/DX INJ NEW DRUG ADDON: CPT

## 2025-07-20 PROCEDURE — 87086 URINE CULTURE/COLONY COUNT: CPT | Mod: GEALAB | Performed by: PHYSICIAN ASSISTANT

## 2025-07-20 RX ORDER — KETOROLAC TROMETHAMINE 30 MG/ML
30 INJECTION, SOLUTION INTRAMUSCULAR; INTRAVENOUS ONCE
Status: COMPLETED | OUTPATIENT
Start: 2025-07-20 | End: 2025-07-20

## 2025-07-20 RX ORDER — KETOROLAC TROMETHAMINE 10 MG/1
10 TABLET, FILM COATED ORAL EVERY 6 HOURS PRN
Qty: 20 TABLET | Refills: 0 | Status: SHIPPED | OUTPATIENT
Start: 2025-07-20 | End: 2025-07-25

## 2025-07-20 RX ADMIN — KETOROLAC TROMETHAMINE 30 MG: 30 INJECTION, SOLUTION INTRAMUSCULAR at 00:42

## 2025-07-21 LAB — BACTERIA UR CULT: NORMAL

## 2025-07-22 DIAGNOSIS — I10 BENIGN ESSENTIAL HYPERTENSION: ICD-10-CM

## 2025-07-22 RX ORDER — HYDROCHLOROTHIAZIDE 25 MG/1
25 TABLET ORAL DAILY
Qty: 90 TABLET | Refills: 3 | Status: SHIPPED | OUTPATIENT
Start: 2025-07-22

## 2025-07-22 RX ORDER — AMLODIPINE BESYLATE 5 MG/1
5 TABLET ORAL 2 TIMES DAILY
Qty: 180 TABLET | Refills: 3 | Status: SHIPPED | OUTPATIENT
Start: 2025-07-22

## 2025-07-22 NOTE — ED PROVIDER NOTES
HPI   Chief Complaint   Patient presents with    Abdominal Pain       History of present illness:  60-year-old male presents the emergency room for complaints of right sided flank pain.  Patient states it all came on very quickly today and he states about 3 to 4 hours ago he developed this intense pain in his right flank rating down to his groin.  He states he had a couple episodes of nausea and vomiting he states he felt better afterwards but he states he just feels like he has to pee.  He states he has had kidney stones in the past and he states it feels similar to this.  He has a past medical history of coronary artery disease diabetes hypertension morbid obesity acid reflux.  He states he has no other symptoms at this time and states that the pain seems to be letting up a little bit.    Social history: Negative for alcohol and drug use.    Review of systems:   Gen.: No weight loss, fatigue, anorexia, insomnia, fever.   Eyes: No vision loss, double vision, drainage, eye pain.   ENT: No pharyngitis, dry mouth.   Cardiac: No chest pain, palpitations, syncope, near syncope.   Pulmonary: No shortness of breath, cough, hemoptysis.   Heme/lymph: No swollen glands, fever, bleeding.   GI: No change in bowel habits, melena, hematemesis, hematochezia, nausea, vomiting, diarrhea.   : No discharge, dysuria, frequency, urgency, hematuria.   Musculoskeletal: No limb pain, joint pain, joint swelling.   Skin: No rashes.   Review of systems is otherwise negative unless stated above or in history of present illness.      Physical exam:  General: Vitals noted, Afebrile.  The patient does not appear uncomfortable at this time but states he was just having some cramping in his right lower side.  EENT: No lymphadenopathy appreciated  Cardiac: Regular, rate, rhythm, no murmur.   Pulmonary: Lungs clear bilaterally with good aeration. No adventitious breath sounds.   Abdomen: Soft, nonsurgical. Nontender. No peritoneal signs.  Normoactive bowel sounds.   Extremities: No peripheral edema.   Skin: No rash.   Neuro: No focal neurologic deficits      Medical decision making:   Testing: Urinalysis shows 6-10 white blood cells and 6-10 red blood cells and +1 calcium oxalate crystals no bacteria seen lactate was 1 lipase 14 CMP shows creatinine 1.34 with GFR at 61 CBC unremarkable CT scan of the abdomen pelvis with contrast shows findings consistent with recently passed stone with right renal edema and mild hydronephrosis on the right side.  No other acute findings interpreted by radiology  Plan: Home-going.  Discussed differential. Will follow-up with Urology in the next 2-3 days. Return if worse. They understand return precautions and discharge instructions. Patient and family/friend/caregiver are in agreement with this plan. 60-year-old male presents the emergency room for complaints of right sided flank pain.  Patient states it all came on very quickly today and he states about 3 to 4 hours ago he developed this intense pain in his right flank rating down to his groin.  He states he had a couple episodes of nausea and vomiting he states he felt better afterwards but he states he just feels like he has to pee.  He states he has had kidney stones in the past and he states it feels similar to this.  He has a past medical history of coronary artery disease diabetes hypertension morbid obesity acid reflux.  He states he has no other symptoms at this time and states that the pain seems to be letting up a little bit. Cardiac: Regular, rate, rhythm, no murmur.   Pulmonary: Lungs clear bilaterally with good aeration. No adventitious breath sounds.   Abdomen: Soft, nonsurgical. Nontender. No peritoneal signs. Normoactive bowel sounds.  I explained to the patient test results as they returned and discussed with him that I believe that he most likely had a recently passed stone.  The patient was in agreement with this given the history.  I explained to  him that I felt that since he was feeling better and that it appears that he has passed the stone that there is no need for any further intervention at this time.  He was in agreement with this plan I encouraged him to please follow-up closely with his urologist in outpatient setting.  Impression:   1.  Kidney stone            History provided by:  Patient   used: No            Patient History   Medical History[1]  Surgical History[2]  Family History[3]  Social History[4]    Physical Exam   ED Triage Vitals [07/19/25 2209]   Temperature Heart Rate Respirations BP   36 °C (96.8 °F) 85 16 155/79      Pulse Ox Temp Source Heart Rate Source Patient Position   97 % Temporal Monitor Lying      BP Location FiO2 (%)     Right arm --       Physical Exam      ED Course & MDM   Diagnoses as of 07/21/25 2058   Kidney stone                 No data recorded     Susan Coma Scale Score: 15 (07/20/25 0059 : Arthur Syed RN)                           Medical Decision Making      Procedure  Procedures       [1]   Past Medical History:  Diagnosis Date    Coronary artery disease     Diabetes mellitus (Multi)     Hypertension     Morbid (severe) obesity due to excess calories (Multi) 11/01/2016    Morbid obesity    Other allergy status, other than to drugs and biological substances     Environmental allergies    Other conditions influencing health status     Ulcer    Palpitations     Heart palpitations    Personal history of other diseases of the circulatory system 04/09/2021    History of malignant hypertension    Personal history of other diseases of the circulatory system     History of atrial fibrillation    Personal history of other diseases of the circulatory system     History of cardiac murmur    Personal history of other diseases of the digestive system 04/07/2014    History of gastroesophageal reflux (GERD)    Personal history of other diseases of the digestive system 04/07/2014    History of  diverticulitis of colon    Personal history of other diseases of the musculoskeletal system and connective tissue     History of gout    Personal history of other diseases of the nervous system and sense organs 04/07/2014    History of sleep apnea    Personal history of other diseases of the nervous system and sense organs     History of glaucoma    Personal history of other diseases of the respiratory system     History of bronchitis    Personal history of other endocrine, nutritional and metabolic disease     History of diabetes mellitus    Personal history of other endocrine, nutritional and metabolic disease     History of obesity    Personal history of other medical treatment     History of stress test    Personal history of other specified conditions     History of headache    Personal history of other specified conditions     History of impaired glucose tolerance   [2]   Past Surgical History:  Procedure Laterality Date    CARDIAC CATHETERIZATION N/A 10/4/2023    Procedure: Left Heart Cath;  Surgeon: Jase Kumar MD;  Location: [facility] Cardiac Cath [facility];  Service: Cardiovascular;  Laterality: N/A;    CARDIAC CATHETERIZATION N/A 10/4/2023    Procedure: PCI;  Surgeon: Jaswinder Botello MD;  Location: [facility] Cardiac Cath Lab;  Service: Cardiovascular;  Laterality: N/A;    COLECTOMY PARTIAL / TOTAL  04/02/2014    Partial Colectomy - Sigmoid    COLONOSCOPY  02/09/2017    Colonoscopy    HAND SURGERY  08/15/2016    Hand Surgery                                                                                                                                                          STOMACH SURGERY  02/09/2017    Gastric Surgery   [3]   Family History  Problem Relation Name Age of Onset    Leukemia Mother      Coronary artery disease Father      Other (Cardiac disorder) Father      Diabetes Other      Other (Cardiac disorder) Sibling      Hypertension Sibling     [4]   Social History  Tobacco Use    Smoking status: Never     Smokeless tobacco: Never   Vaping Use    Vaping status: Never Used   Substance Use Topics    Alcohol use: Yes     Alcohol/week: 8.0 - 12.0 standard drinks of alcohol     Types: 8 - 12 Cans of beer per week    Drug use: Never        Cyrus Mascorro PA-C  07/21/25 4857

## 2025-08-28 ENCOUNTER — TELEPHONE (OUTPATIENT)
Dept: HEMATOLOGY/ONCOLOGY | Facility: CLINIC | Age: 60
End: 2025-08-28
Payer: COMMERCIAL

## 2025-09-02 DIAGNOSIS — K90.9 INTESTINAL MALABSORPTION, UNSPECIFIED TYPE (HHS-HCC): ICD-10-CM

## 2025-09-02 DIAGNOSIS — D50.9 IRON DEFICIENCY ANEMIA, UNSPECIFIED IRON DEFICIENCY ANEMIA TYPE: ICD-10-CM

## 2025-10-22 ENCOUNTER — APPOINTMENT (OUTPATIENT)
Dept: PRIMARY CARE | Facility: CLINIC | Age: 60
End: 2025-10-22
Payer: COMMERCIAL

## (undated) DEVICE — GUIDEWIRE, RUN THROUGH WIRE, 180CM

## (undated) DEVICE — CATHETER, GUIDING, LAUNCHER, 6FR EBU 3.5

## (undated) DEVICE — CLOSURE DEVICE, VASCULAR, MYNX, 6FR/7FR

## (undated) DEVICE — CATHETER, ANGIO, EXPO, WRP, 5 FR X 100 CM

## (undated) DEVICE — CATHETER, ANGIO, IMPULSE, FL4, 5 FR X 100 CM

## (undated) DEVICE — ACCESS SYSTEM, PINNACLE PRECISION, 5FR X 10CM, ECHOGENIC NEEDLE

## (undated) DEVICE — CATHETER, EAGLE EYE, PLATNIUM (IVUS)

## (undated) DEVICE — NEEDLE, MERIT ADVANCE, ONE WALL,  21GA X 7.0CM, STANDARD SMOOTH

## (undated) DEVICE — Device

## (undated) DEVICE — SHEATH, PINNACLE, 10 CM,  6FR INTRODUCER, 6FR DIA, 2.5 CM DIALATOR

## (undated) DEVICE — CATHETER, BALLOON, NC EUPHORA NONCOMPLIANT 2.5 X 12 X 142CM

## (undated) DEVICE — ANGIO KIT, LEFT HEART, LF, CUSTOM

## (undated) DEVICE — CATHETER, BALLOON, NC EUPHORA NONCOMPLIANT 3.75 X 12 X 142CM